# Patient Record
Sex: MALE | Race: WHITE | Employment: OTHER | URBAN - METROPOLITAN AREA
[De-identification: names, ages, dates, MRNs, and addresses within clinical notes are randomized per-mention and may not be internally consistent; named-entity substitution may affect disease eponyms.]

---

## 2013-09-13 LAB — COLONOSCOPY, EXTERNAL: NORMAL

## 2018-06-08 ENCOUNTER — OFFICE VISIT (OUTPATIENT)
Dept: FAMILY MEDICINE CLINIC | Age: 68
End: 2018-06-08

## 2018-06-08 ENCOUNTER — PATIENT MESSAGE (OUTPATIENT)
Dept: FAMILY MEDICINE CLINIC | Age: 68
End: 2018-06-08

## 2018-06-08 VITALS
HEIGHT: 73 IN | TEMPERATURE: 98.2 F | WEIGHT: 174 LBS | BODY MASS INDEX: 23.06 KG/M2 | RESPIRATION RATE: 18 BRPM | DIASTOLIC BLOOD PRESSURE: 76 MMHG | SYSTOLIC BLOOD PRESSURE: 138 MMHG | OXYGEN SATURATION: 97 % | HEART RATE: 64 BPM

## 2018-06-08 DIAGNOSIS — Z12.11 SCREENING FOR COLON CANCER: ICD-10-CM

## 2018-06-08 DIAGNOSIS — M85.80 OSTEOPENIA, UNSPECIFIED LOCATION: ICD-10-CM

## 2018-06-08 DIAGNOSIS — Z00.00 ENCOUNTER FOR MEDICARE ANNUAL WELLNESS EXAM: Primary | ICD-10-CM

## 2018-06-08 DIAGNOSIS — E78.5 HYPERLIPIDEMIA, UNSPECIFIED HYPERLIPIDEMIA TYPE: ICD-10-CM

## 2018-06-08 DIAGNOSIS — Z11.59 NEED FOR HEPATITIS C SCREENING TEST: ICD-10-CM

## 2018-06-08 DIAGNOSIS — M79.2 NEUROPATHIC PAIN, LEG, RIGHT: ICD-10-CM

## 2018-06-08 RX ORDER — ATORVASTATIN CALCIUM 10 MG/1
10 TABLET, FILM COATED ORAL DAILY
COMMUNITY
End: 2018-06-12 | Stop reason: SDUPTHER

## 2018-06-08 RX ORDER — GABAPENTIN 300 MG/1
300 CAPSULE ORAL
COMMUNITY
End: 2018-06-12 | Stop reason: SDUPTHER

## 2018-06-08 RX ORDER — GUAIFENESIN 100 MG/5ML
81 LIQUID (ML) ORAL DAILY
COMMUNITY
End: 2018-07-18

## 2018-06-08 RX ORDER — NABUMETONE 750 MG/1
750 TABLET, FILM COATED ORAL AS NEEDED
COMMUNITY
End: 2018-07-18

## 2018-06-08 RX ORDER — ALENDRONATE SODIUM 70 MG/1
70 TABLET ORAL
COMMUNITY
End: 2018-06-12 | Stop reason: SDUPTHER

## 2018-06-08 NOTE — MR AVS SNAPSHOT
[facility] 
 
 
 [address] 
[phone] Patient: Sanchez Arroyo MRN: [id number] POW:9[birth year]34 Visit Information Date & Time Provider Department Dept. Phone Encounter #  
 6/8/2018  8:30 AM More Michel  [facility] [phone] [id number] Follow-up Instructions Return in about 3 months (around 9/8/2018) for HTN follow up, Routine follow up (30 min). Upcoming Health Maintenance Date Due Hepatitis C Screening 1950 DTaP/Tdap/Td series (1 - Tdap) 7/19/1972 FOBT Q 1 YEAR AGE 50-75 7/19/2001 ZOSTER VACCINE AGE 60> 5/19/2011 GLAUCOMA SCREENING Q2Y 7/19/2016 Pneumococcal 65+ Low/Medium Risk (1 of 2 - PCV13) 7/19/2016 Influenza Age 5 to Adult 8/1/2018 Allergies as of 6/8/2018  Review Complete On: 6/8/2018 By: Ed Bartlett No Known Allergies Current Immunizations  Never Reviewed Name Date Hep B Vaccine 8/6/2014, 3/5/2014, 2/6/2014 Not reviewed this visit You Were Diagnosed With   
  
 Codes Comments Hyperlipidemia, unspecified hyperlipidemia type    -  Primary ICD-10-CM: E78.5 ICD-9-CM: 272.4 Osteopenia, unspecified location     ICD-10-CM: M85.80 ICD-9-CM: 733.90 Need for hepatitis C screening test     ICD-10-CM: Z11.59 
ICD-9-CM: V73.89 Screening for colon cancer     ICD-10-CM: Z12.11 ICD-9-CM: V76.51 Vitals BP Pulse Temp Resp Height(growth percentile) Weight(growth percentile) 160/78 (BP 1 Location: Right arm, BP Patient Position: Sitting) 64 98.2 °F (36.8 °C) (Oral) 18 6' 1\" (1.854 m) 174 lb (78.9 kg) SpO2 BMI Smoking Status 97% 22.96 kg/m2 Former Smoker BMI and BSA Data Body Mass Index Body Surface Area  
 22.96 kg/m 2 2.02 m 2 Preferred Pharmacy Pharmacy Name Phone Barbara Urban [address] [facility], [address] [facility] [phone] Your Updated Medication List  
  
   
This list is accurate as of 6/8/18  9:17 AM.  Always use your most recent med list.  
  
  
  
  
 alendronate 70 mg tablet Commonly known as:  FOSAMAX Take 70 mg by mouth every seven (7) days. aspirin 81 mg chewable tablet Take 81 mg by mouth daily. atorvastatin 10 mg tablet Commonly known as:  LIPITOR Take 10 mg by mouth daily. gabapentin 300 mg capsule Commonly known as:  NEURONTIN Take 300 mg by mouth ACB/HS. nabumetone 750 mg tablet Commonly known as:  RELAFEN Take 750 mg by mouth as needed for Pain. We Performed the Following HCV AB W/RFLX TO TONY [72370 CPT(R)] LIPID PANEL [12549 CPT(R)] METABOLIC PANEL, COMPREHENSIVE [44892 CPT(R)] REFERRAL TO GASTROENTEROLOGY [DXK95 Custom] Comments:  
 Patient will schedule referral himself/herself VITAMIN D, 25 HYDROXY [id number] CPT(R)] Follow-up Instructions Return in about 3 months (around 9/8/2018) for HTN follow up, Routine follow up (30 min). Referral Information Referral ID Referred By Referred To  
  
 [id number] [facility] [address], [address] Phone: [phone] Fax: [phone] Visits Status Start Date End Date 1 New Request 6/8/18 6/8/19 If your referral has a status of pending review or denied, additional information will be sent to support the outcome of this decision. Patient Instructions Back Pain: Care Instructions Your Care Instructions Back pain has many possible causes. It is often related to problems with muscles and ligaments of the back. It may also be related to problems with the nerves, discs, or bones of the back. Moving, lifting, standing, sitting, or sleeping in an awkward way can strain the back.  Sometimes you don't notice the injury until later. Arthritis is another common cause of back pain. Although it may hurt a lot, back pain usually improves on its own within several weeks. Most people recover in 12 weeks or less. Using good home treatment and being careful not to stress your back can help you feel better sooner. Follow-up care is a key part of your treatment and safety. Be sure to make and go to all appointments, and call your doctor if you are having problems. It's also a good idea to know your test results and keep a list of the medicines you take. How can you care for yourself at home? · Sit or lie in positions that are most comfortable and reduce your pain. Try one of these positions when you lie down: ¨ Lie on your back with your knees bent and supported by large pillows. ¨ Lie on the floor with your legs on the seat of a sofa or chair. Jeffory Amass on your side with your knees and hips bent and a pillow between your legs. ¨ Lie on your stomach if it does not make pain worse. · Do not sit up in bed, and avoid soft couches and twisted positions. Bed rest can help relieve pain at first, but it delays healing. Avoid bed rest after the first day of back pain. · Change positions every 30 minutes. If you must sit for long periods of time, take breaks from sitting. Get up and walk around, or lie in a comfortable position. · Try using a heating pad on a low or medium setting for 15 to 20 minutes every 2 or 3 hours. Try a warm shower in place of one session with the heating pad. · You can also try an ice pack for 10 to 15 minutes every 2 to 3 hours. Put a thin cloth between the ice pack and your skin. · Take pain medicines exactly as directed. ¨ If the doctor gave you a prescription medicine for pain, take it as prescribed. ¨ If you are not taking a prescription pain medicine, ask your doctor if you can take an over-the-counter medicine. · Take short walks several times a day.  You can start with 5 to 10 minutes, 3 or 4 times a day, and work up to longer walks. Walk on level surfaces and avoid hills and stairs until your back is better. · Return to work and other activities as soon as you can. Continued rest without activity is usually not good for your back. · To prevent future back pain, do exercises to stretch and strengthen your back and stomach. Learn how to use good posture, safe lifting techniques, and proper body mechanics. When should you call for help? Call your doctor now or seek immediate medical care if: 
? · You have new or worsening numbness in your legs. ? · You have new or worsening weakness in your legs. (This could make it hard to stand up.) ? · You lose control of your bladder or bowels. ? Watch closely for changes in your health, and be sure to contact your doctor if: 
? · Your pain gets worse. ? · You are not getting better after 2 weeks. Where can you learn more? Go to http://sarah-urszula.info/. Enter M208 in the search box to learn more about \"Back Pain: Care Instructions. \" Current as of: March 21, 2017 Content Version: 11.4 © 2378-9742 [employer]. Care instructions adapted under license by [employer] (which disclaims liability or warranty for this information). If you have questions about a medical condition or this instruction, always ask your healthcare professional. [address] any warranty or liability for your use of this information. Introducing [facility]! Chelsea Grullon introduces [employer] patient portal. Now you can access parts of your medical record, email your doctor's office, and request medication refills online. 1. In your internet browser, go to [email or web address] 2. Click on the First Time User? Click Here link in the Sign In box. You will see the New Member Sign Up page. 3. Enter your [employer] Access Code exactly as it appears below.  You will not need to use this code after youve completed the sign-up process. If you do not sign up before the expiration date, you must request a new code. · [employer] Access Code: [email or web address] Expires: 9/6/2018  8:24 AM 
 
4. Enter the last four digits of your Social Security Number (xxxx) and Date of Birth (mm/dd/yyyy) as indicated and click Submit. You will be taken to the next sign-up page. 5. Create a [employer] ID. This will be your [employer] login ID and cannot be changed, so think of one that is secure and easy to remember. 6. Create a [employer] password. You can change your password at any time. 7. Enter your Password Reset Question and Answer. This can be used at a later time if you forget your password. 8. Enter your e-mail address. You will receive e-mail notification when new information is available in [address] 9. Click Sign Up. You can now view and download portions of your medical record. 10. Click the Download Summary menu link to download a portable copy of your medical information. If you have questions, please visit the Frequently Asked Questions section of the [employer] website. Remember, [employer] is NOT to be used for urgent needs. For medical emergencies, dial 911. Now available from your iPhone and Android! Please provide this summary of care documentation to your next provider. Your primary care clinician is listed as Ke Douglas. If you have any questions after today's visit, please call [phone].

## 2018-06-08 NOTE — PROGRESS NOTES
Chief Complaint   Patient presents with    New Patient     1. Have you been to the ER, urgent care clinic since your last visit? Hospitalized since your last visit? No    2. Have you seen or consulted any other health care providers outside of the [address] [place] [address] since your last visit? Include any pap smears or colon screening.  No

## 2018-06-08 NOTE — PROGRESS NOTES
Nelson Romo is a 78 y.o. male and presents for annual Medicare Wellness Visit. Problem List: Reviewed with patient and discussed risk factors. Patient Active Problem List   Diagnosis Code    HLD (hyperlipidemia) E78.5    Osteopenia M85.80    Spinal stenosis M48.00    Hiatal hernia K44.9    Schatzki's ring K22.2    Neuropathic pain, leg, right G57.91       Current medical providers:  Patient Care Team:  Francy Hall MD as PCP - General (Family Practice)    PSH: Reviewed with patient  Past Surgical History:   Procedure Laterality Date    HX COLONOSCOPY  09/13/2013    polyps    HX ENDOSCOPY  08/2017    HX HEENT Left 2016    cataract        SH: Reviewed with patient  Social History   Substance Use Topics    Smoking status: Former Smoker    Smokeless tobacco: Never Used    Alcohol use 0.6 oz/week     1 Shots of liquor per week       FH: Reviewed with patient  Family History   Problem Relation Age of Onset    COPD Father     Stomach Cancer Maternal Grandfather        Medications/Allergies: Reviewed with patient  No current outpatient prescriptions on file prior to visit. No current facility-administered medications on file prior to visit. No Known Allergies    Objective:  Visit Vitals    /78 (BP 1 Location: Right arm, BP Patient Position: Sitting)    Pulse 64    Temp 98.2 °F (36.8 °C) (Oral)    Resp 18    Ht 6' 1\" (1.854 m)    Wt 174 lb (78.9 kg)    SpO2 97%    BMI 22.96 kg/m2    Body mass index is 22.96 kg/(m^2). Assessment of cognitive impairment: Alert and oriented x 3    Depression Screen:   PHQ over the last two weeks 6/8/2018   Little interest or pleasure in doing things Not at all   Feeling down, depressed or hopeless Not at all   Total Score PHQ 2 0       Fall Risk Assessment:    Fall Risk Assessment, last 12 mths 6/8/2018   Able to walk? Yes   Fall in past 12 months? No       Functional Ability:   Does the patient exhibit a steady gait?   yes   How long did it take the patient to get up and walk from a sitting position? 3 secs   Is the patient self reliant?  (ie can do own laundry, meals, household chores)  yes     Does the patient handle his/her own medications? yes     Does the patient handle his/her own money? yes     Is the patients home safe (ie good lighting, handrails on stairs and bath, etc.)? yes     Did you notice or did patient express any hearing difficulties? no     Did you notice or did patient express any vision difficulties?   no     Were distance and reading eye charts used? no       Advance Care Planning:   Patient was offered the opportunity to discuss advance care planning:  yes     Does patient have an Advance Directive:  yes   If no, did you provide information on Caring Connections? Pt to bring by copy       Plan:      Orders Placed This Encounter    METABOLIC PANEL, COMPREHENSIVE    LIPID PANEL    VITAMIN D, 25 HYDROXY    HCV AB W/RFLX TO TONY    REFERRAL TO GASTROENTEROLOGY    gabapentin (NEURONTIN) 300 mg capsule    alendronate (FOSAMAX) 70 mg tablet    atorvastatin (LIPITOR) 10 mg tablet    aspirin 81 mg chewable tablet    nabumetone (RELAFEN) 750 mg tablet       Health Maintenance   Topic Date Due    Hepatitis C Screening  1950    COLONOSCOPY  07/19/1969    GLAUCOMA SCREENING Q2Y  07/19/2016    Influenza Age 9 to Adult  08/01/2018    MEDICARE YEARLY EXAM  06/09/2019    DTaP/Tdap/Td series (2 - Td) 02/27/2023    ZOSTER VACCINE AGE 60>  Completed    Pneumococcal 65+ Low/Medium Risk  Completed       *Patient verbalized understanding and agreement with the plan. A copy of the After Visit Summary with personalized health plan was given to the patient today.

## 2018-06-08 NOTE — ACP (ADVANCE CARE PLANNING)
Advance Care Planning:   Patient was offered the opportunity to discuss advance care planning:  yes   Does patient have an Advance Directive:  yes   If no, did you provide information on Caring Connections?   Pt to bring by copy

## 2018-06-08 NOTE — PROGRESS NOTES
Patient Name: Nelson Romo   MRN: <[id number]>    Ericka Ramirez is a 78 y.o. male who presents with the following: Here to establish care with new PCP. Colon Cancer Screening: not up to date - will refer to GI. Hep C: due   PSA: pt declines screening after reviewing risks/benefits  AAA screening: done in past; normal    CAD risk factors:  HTN: elevated today, no hx of HTN  Lipid: on statin  DM: negative. Has been on Fosamax for possibly 5 years due to osteopenia and history compression fracture. Last DEXA scan was January 2017. Tolerating medication well. Recently establish care with [facility] due to ongoing spinal stenosis and R leg neuropathy pain. Currently taking gabapentin 600 mg at nighttime; was started initially at 300 mg in the morning with 600 mg in the evening but found that the morning dose with too sedating. Has not tried lower doses in the past.  [facility]ill of gabapentin 300 mg at the 90 pills on 4/30/18. Review of Systems   Constitutional: Negative for chills, fever, malaise/fatigue and weight loss. HENT: Negative for hearing loss, nosebleeds and sore throat. Respiratory: Negative for cough, sputum production, shortness of breath and wheezing. Cardiovascular: Negative for chest pain, palpitations, leg swelling and PND. Gastrointestinal: Negative for abdominal pain, blood in stool, constipation, diarrhea, nausea and vomiting. Genitourinary: Negative for dysuria, frequency and urgency. Musculoskeletal: Positive for back pain. Negative for falls, joint pain, myalgias and neck pain. Skin: Negative for itching and rash. Neurological: Positive for tingling. Negative for dizziness, sensory change, focal weakness and loss of consciousness. Psychiatric/Behavioral: Negative for depression. The patient is not nervous/anxious. All other systems reviewed and are negative.       The patient's medications, allergies, past medical history, surgical history, family history and social history were reviewed and updated where appropriate. Prior to Admission medications    Medication Sig Start Date End Date Taking? Authorizing Provider   gabapentin (NEURONTIN) 300 mg capsule Take 300 mg by mouth ACB/HS. Yes Historical Provider   alendronate (FOSAMAX) 70 mg tablet Take 70 mg by mouth every seven (7) days. Yes Historical Provider   atorvastatin (LIPITOR) 10 mg tablet Take 10 mg by mouth daily. Yes Historical Provider   aspirin 81 mg chewable tablet Take 81 mg by mouth daily. Yes Historical Provider   nabumetone (RELAFEN) 750 mg tablet Take 750 mg by mouth as needed for Pain. Yes Historical Provider       No Known Allergies      Past Medical History:   Diagnosis Date    Hiatal hernia     HLD (hyperlipidemia)     Neuropathic pain, leg, right     Osteopenia     DEXA 1/25/17    Schatzki's ring     Spinal stenosis     DJD, stenosis, cyst along L5       Past Surgical History:   Procedure Laterality Date    HX COLONOSCOPY  09/13/2013    polyps    HX ENDOSCOPY  08/2017    HX HEENT Left 2016    cataract       Family History   Problem Relation Age of Onset    COPD Father     Stomach Cancer Maternal Grandfather        Social History     Social History    Marital status:      Spouse name: N/A    Number of children: N/A    Years of education: N/A     Occupational History    Not on file.      Social History Main Topics    Smoking status: Former Smoker    Smokeless tobacco: Never Used    Alcohol use 0.6 oz/week     1 Shots of liquor per week    Drug use: No    Sexual activity: Not on file     Other Topics Concern    Not on file     Social History Narrative    No narrative on file           OBJECTIVE    Visit Vitals    /78 (BP 1 Location: Right arm, BP Patient Position: Sitting)    Pulse 64    Temp 98.2 °F (36.8 °C) (Oral)    Resp 18    Ht 6' 1\" (1.854 m)    Wt 174 lb (78.9 kg)    SpO2 97%    BMI 22.96 kg/m2 Physical Exam   Constitutional: He is oriented to person, place, and time and well-developed, well-nourished, and in no distress. No distress. Eyes: Conjunctivae and EOM are normal. Pupils are equal, round, and reactive to light. Cardiovascular: Normal rate, regular rhythm and normal heart sounds. Exam reveals no gallop and no friction rub. No murmur heard. Pulmonary/Chest: Effort normal and breath sounds normal. No respiratory distress. He has no wheezes. Neurological: He is alert and oriented to person, place, and time. Skin: Skin is warm and dry. No rash noted. He is not diaphoretic. Psychiatric: Mood, memory, affect and judgment normal.   Nursing note and vitals reviewed. ASSESSMENT AND PLAN  Santosh Paz is a 78 y.o. male who presents today for:    1. Encounter for Medicare annual wellness exam  See other note. 2. Hyperlipidemia, unspecified hyperlipidemia type  Will calculate ASCVD risk score pending labs. - METABOLIC PANEL, COMPREHENSIVE  - LIPID PANEL    3. Osteopenia, unspecified location  Stable, continue current treatment pending review of labs. Will repeat DEXA in 1/2019; consider drug holiday of Fosamax at that time. - VITAMIN D, 25 HYDROXY    4. Need for hepatitis C screening test  - HCV AB W/RFLX TO TONY    5. Screening for colon cancer  - REFERRAL TO GASTROENTEROLOGY    6. Neuropathic pain, leg, right  Advised pt to decrease to 300 mg of gabapentin nightly to see if symptoms well controlled at lower doses. Discussed availability of 100 mg capsules for additional titrated prn. There are no discontinued medications. Follow-up Disposition:  Return in about 3 months (around 9/8/2018) for HTN follow up, Routine follow up (30 min). Medication risks/benefits/costs/interactions/alternatives discussed with patient.   Advised patient to call back or return to office if symptoms worsen/change/persist. If patient cannot reach us or should anything more severe/urgent arise he/she should proceed directly to the nearest emergency department. Discussed expected course/resolution/complications of diagnosis in detail with patient. Patient given a written after visit summary which includes his/her diagnoses, current medications and vitals. Patient expressed understanding with the diagnosis and plan.      Gayatri Turcios M.D.

## 2018-06-08 NOTE — PATIENT INSTRUCTIONS

## 2018-06-12 RX ORDER — ALENDRONATE SODIUM 70 MG/1
70 TABLET ORAL
Qty: 12 TAB | Refills: 1 | Status: SHIPPED | OUTPATIENT
Start: 2018-06-12 | End: 2018-11-14 | Stop reason: SDUPTHER

## 2018-06-12 RX ORDER — ATORVASTATIN CALCIUM 10 MG/1
10 TABLET, FILM COATED ORAL DAILY
Qty: 90 TAB | Refills: 1 | Status: SHIPPED | OUTPATIENT
Start: 2018-06-12 | End: 2018-11-14 | Stop reason: SDUPTHER

## 2018-06-12 RX ORDER — GABAPENTIN 300 MG/1
300 CAPSULE ORAL
Qty: 90 CAP | Refills: 0 | Status: SHIPPED | OUTPATIENT
Start: 2018-06-12 | End: 2019-02-15

## 2018-06-15 LAB
25(OH)D3+25(OH)D2 SERPL-MCNC: 58.7 NG/ML (ref 30–100)
ALBUMIN SERPL-MCNC: 4.2 G/DL (ref 3.6–4.8)
ALBUMIN/GLOB SERPL: 2.1 {RATIO} (ref 1.2–2.2)
ALP SERPL-CCNC: 49 IU/L (ref 39–117)
ALT SERPL-CCNC: 21 IU/L (ref 0–44)
AST SERPL-CCNC: 27 IU/L (ref 0–40)
BILIRUB SERPL-MCNC: 0.6 MG/DL (ref 0–1.2)
BUN SERPL-MCNC: 12 MG/DL (ref 8–27)
BUN/CREAT SERPL: 10 (ref 10–24)
CALCIUM SERPL-MCNC: 9.2 MG/DL (ref 8.6–10.2)
CHLORIDE SERPL-SCNC: 106 MMOL/L (ref 96–106)
CHOLEST SERPL-MCNC: 128 MG/DL (ref 100–199)
CO2 SERPL-SCNC: 23 MMOL/L (ref 20–29)
CREAT SERPL-MCNC: 1.22 MG/DL (ref 0.76–1.27)
GFR SERPLBLD CREATININE-BSD FMLA CKD-EPI: 61 ML/MIN/1.73
GFR SERPLBLD CREATININE-BSD FMLA CKD-EPI: 71 ML/MIN/1.73
GLOBULIN SER CALC-MCNC: 2 G/DL (ref 1.5–4.5)
GLUCOSE SERPL-MCNC: 86 MG/DL (ref 65–99)
HCV AB S/CO SERPL IA: <0.1 S/CO RATIO (ref 0–0.9)
HCV AB SERPL QL IA: NORMAL
HDLC SERPL-MCNC: 65 MG/DL
INTERPRETATION, 910389: NORMAL
LDLC SERPL CALC-MCNC: 54 MG/DL (ref 0–99)
POTASSIUM SERPL-SCNC: 4.6 MMOL/L (ref 3.5–5.2)
PROT SERPL-MCNC: 6.2 G/DL (ref 6–8.5)
SODIUM SERPL-SCNC: 142 MMOL/L (ref 134–144)
TRIGL SERPL-MCNC: 44 MG/DL (ref 0–149)
VLDLC SERPL CALC-MCNC: 9 MG/DL (ref 5–40)

## 2018-07-18 RX ORDER — ASCORBIC ACID 500 MG
500 TABLET ORAL DAILY
COMMUNITY
End: 2021-11-04

## 2018-07-19 ENCOUNTER — ANESTHESIA EVENT (OUTPATIENT)
Dept: ENDOSCOPY | Age: 68
End: 2018-07-19
Payer: MEDICARE

## 2018-07-19 ENCOUNTER — HOSPITAL ENCOUNTER (OUTPATIENT)
Age: 68
Setting detail: OUTPATIENT SURGERY
Discharge: HOME | End: 2018-07-19
Attending: INTERNAL MEDICINE | Admitting: INTERNAL MEDICINE
Payer: MEDICARE

## 2018-07-19 ENCOUNTER — ANESTHESIA (OUTPATIENT)
Dept: ENDOSCOPY | Age: 68
End: 2018-07-19
Payer: MEDICARE

## 2018-07-19 VITALS
DIASTOLIC BLOOD PRESSURE: 66 MMHG | WEIGHT: 174 LBS | SYSTOLIC BLOOD PRESSURE: 122 MMHG | TEMPERATURE: 97.4 F | RESPIRATION RATE: 17 BRPM | HEIGHT: 73 IN | BODY MASS INDEX: 23.06 KG/M2 | OXYGEN SATURATION: 93 %

## 2018-07-19 PROCEDURE — 74011000250 HC RX REV CODE- 250

## 2018-07-19 PROCEDURE — 74011250637 HC RX REV CODE- 250/637: Performed by: INTERNAL MEDICINE

## 2018-07-19 PROCEDURE — 74011250636 HC RX REV CODE- 250/636

## 2018-07-19 PROCEDURE — 77030027957 HC TBNG IRR ENDOGTR BUSS -B: Performed by: INTERNAL MEDICINE

## 2018-07-19 PROCEDURE — 76040000019: Performed by: INTERNAL MEDICINE

## 2018-07-19 PROCEDURE — 88305 TISSUE EXAM BY PATHOLOGIST: CPT | Performed by: INTERNAL MEDICINE

## 2018-07-19 PROCEDURE — 76060000031 HC ANESTHESIA FIRST 0.5 HR: Performed by: INTERNAL MEDICINE

## 2018-07-19 PROCEDURE — 77030009426 HC FCPS BIOP ENDOSC BSC -B: Performed by: INTERNAL MEDICINE

## 2018-07-19 RX ORDER — SODIUM CHLORIDE 9 MG/ML
100 INJECTION, SOLUTION INTRAVENOUS CONTINUOUS
Status: DISCONTINUED | OUTPATIENT
Start: 2018-07-19 | End: 2018-07-19 | Stop reason: HOSPADM

## 2018-07-19 RX ORDER — SODIUM CHLORIDE 0.9 % (FLUSH) 0.9 %
5-10 SYRINGE (ML) INJECTION AS NEEDED
Status: DISCONTINUED | OUTPATIENT
Start: 2018-07-19 | End: 2018-07-19 | Stop reason: HOSPADM

## 2018-07-19 RX ORDER — ATROPINE SULFATE 0.1 MG/ML
0.5 INJECTION INTRAVENOUS
Status: DISCONTINUED | OUTPATIENT
Start: 2018-07-19 | End: 2018-07-19 | Stop reason: HOSPADM

## 2018-07-19 RX ORDER — PROPOFOL 10 MG/ML
INJECTION, EMULSION INTRAVENOUS AS NEEDED
Status: DISCONTINUED | OUTPATIENT
Start: 2018-07-19 | End: 2018-07-19 | Stop reason: HOSPADM

## 2018-07-19 RX ORDER — SODIUM CHLORIDE 9 MG/ML
INJECTION, SOLUTION INTRAVENOUS
Status: DISCONTINUED | OUTPATIENT
Start: 2018-07-19 | End: 2018-07-19 | Stop reason: HOSPADM

## 2018-07-19 RX ORDER — FENTANYL CITRATE 50 UG/ML
100 INJECTION, SOLUTION INTRAMUSCULAR; INTRAVENOUS
Status: DISCONTINUED | OUTPATIENT
Start: 2018-07-19 | End: 2018-07-19 | Stop reason: HOSPADM

## 2018-07-19 RX ORDER — FLUMAZENIL 0.1 MG/ML
0.2 INJECTION INTRAVENOUS
Status: DISCONTINUED | OUTPATIENT
Start: 2018-07-19 | End: 2018-07-19 | Stop reason: HOSPADM

## 2018-07-19 RX ORDER — LIDOCAINE HYDROCHLORIDE 20 MG/ML
INJECTION, SOLUTION EPIDURAL; INFILTRATION; INTRACAUDAL; PERINEURAL AS NEEDED
Status: DISCONTINUED | OUTPATIENT
Start: 2018-07-19 | End: 2018-07-19 | Stop reason: HOSPADM

## 2018-07-19 RX ORDER — DEXTROMETHORPHAN/PSEUDOEPHED 2.5-7.5/.8
1.2 DROPS ORAL
Status: DISCONTINUED | OUTPATIENT
Start: 2018-07-19 | End: 2018-07-19 | Stop reason: HOSPADM

## 2018-07-19 RX ORDER — NALOXONE HYDROCHLORIDE 0.4 MG/ML
0.4 INJECTION, SOLUTION INTRAMUSCULAR; INTRAVENOUS; SUBCUTANEOUS
Status: DISCONTINUED | OUTPATIENT
Start: 2018-07-19 | End: 2018-07-19 | Stop reason: HOSPADM

## 2018-07-19 RX ORDER — SODIUM CHLORIDE 0.9 % (FLUSH) 0.9 %
5-10 SYRINGE (ML) INJECTION EVERY 8 HOURS
Status: DISCONTINUED | OUTPATIENT
Start: 2018-07-19 | End: 2018-07-19 | Stop reason: HOSPADM

## 2018-07-19 RX ORDER — DIPHENHYDRAMINE HYDROCHLORIDE 50 MG/ML
50 INJECTION, SOLUTION INTRAMUSCULAR; INTRAVENOUS ONCE
Status: DISCONTINUED | OUTPATIENT
Start: 2018-07-19 | End: 2018-07-19 | Stop reason: HOSPADM

## 2018-07-19 RX ORDER — MIDAZOLAM HYDROCHLORIDE 1 MG/ML
.25-1 INJECTION, SOLUTION INTRAMUSCULAR; INTRAVENOUS
Status: DISCONTINUED | OUTPATIENT
Start: 2018-07-19 | End: 2018-07-19 | Stop reason: HOSPADM

## 2018-07-19 RX ORDER — EPINEPHRINE 0.1 MG/ML
1 INJECTION INTRACARDIAC; INTRAVENOUS
Status: DISCONTINUED | OUTPATIENT
Start: 2018-07-19 | End: 2018-07-19 | Stop reason: HOSPADM

## 2018-07-19 RX ADMIN — PROPOFOL 30 MG: 10 INJECTION, EMULSION INTRAVENOUS at 09:54

## 2018-07-19 RX ADMIN — PROPOFOL 50 MG: 10 INJECTION, EMULSION INTRAVENOUS at 09:51

## 2018-07-19 RX ADMIN — PROPOFOL 50 MG: 10 INJECTION, EMULSION INTRAVENOUS at 10:00

## 2018-07-19 RX ADMIN — PROPOFOL 40 MG: 10 INJECTION, EMULSION INTRAVENOUS at 09:49

## 2018-07-19 RX ADMIN — LIDOCAINE HYDROCHLORIDE 60 MG: 20 INJECTION, SOLUTION EPIDURAL; INFILTRATION; INTRACAUDAL; PERINEURAL at 09:48

## 2018-07-19 RX ADMIN — PROPOFOL 30 MG: 10 INJECTION, EMULSION INTRAVENOUS at 09:52

## 2018-07-19 RX ADMIN — SODIUM CHLORIDE: 9 INJECTION, SOLUTION INTRAVENOUS at 09:30

## 2018-07-19 RX ADMIN — PROPOFOL 60 MG: 10 INJECTION, EMULSION INTRAVENOUS at 09:48

## 2018-07-19 NOTE — PROCEDURES
[address]  [address] [facility], [address] [place] [address]        Colonoscopy Operative Report    Jean Paul Roa  [id number]  1950      Procedure Type:   Colonoscopy --screening     Indications:    Personal history of colon polyps (screening only)         Pre-operative Diagnosis: see indication above    Post-operative Diagnosis:  See findings below    :  Eugenia NeriAntwan Arredondo MD      Referring Provider: Lisa Veras MD      Sedation:  MAC anesthesia Propofol      Procedure Details:  After informed consent was obtained with all risks and benefits of procedure explained and preoperative exam completed, the patient was taken to the endoscopy suite and placed in the left lateral decubitus position. Upon sequential sedation as per above, a digital rectal exam was performed demonstrating internal hemorrhoids. The Olympus pediatric videocolonoscope  was inserted in the rectum and carefully advanced to the cecum, which was identified by the ileocecal valve and appendiceal orifice. The cecum was identified by the ileocecal valve and appendiceal orifice. The quality of preparation was good. The colonoscope was slowly withdrawn with careful evaluation between folds. Retroflexion in the rectum was completed . Findings:   Rectum: small internal hemorrhoids  Sigmoid: normal  Descending Colon: normal  Transverse Colon: normal  Ascending Colon: in the proximal ascending colon (approximately two folds distal to the IC valve), there was a focal region of what appeared to be redundant folds with possibly a central umbilication vs diverticulum. Under NBI this did not have an abnormal vascular pattern or a distinct margin. This was cold biopsied. Cecum: normal  Terminal Ileum: not intubated      Specimen Removed: 1. Ascending colon biopsy    Complications: None. EBL:  None. Impression:     1. Ascending colon thickened fold - biopsied  2.  Small internal hemorrhoids    Recommendations:  1. Follow up surgical pathology  2. Next step and timing of repeat colonoscopy to be determined based on pathology results  3. High fiber diet education    Signed By: Juanita Diaz.  Bhumi Camargo MD     7/19/2018  10:17 AM

## 2018-07-19 NOTE — IP AVS SNAPSHOT
2700 [facility] [address] [place] [address] 
[phone] Patient: Silvia Coyle MRN: [id number] NCR:[id number] About your hospitalization You were admitted on:  July 19, 2018 You last received care in the:  [facility] ENDOSCOPY You were discharged on:  July 19, 2018 Why you were hospitalized Your primary diagnosis was:  Not on File Follow-up Information None Your Scheduled Appointments Thursday September 13, 2018  8:15 AM EDT ROUTINE CARE with Jose M Serrano MD  
[facility]) 222 [facility] [address] [place] [address]  
[phone] Discharge Orders None A check shante indicates which time of day the medication should be taken. My Medications CONTINUE taking these medications Instructions Each Dose to Equal  
 Morning Noon Evening Bedtime  
 alendronate 70 mg tablet Commonly known as:  FOSAMAX Your last dose was: Your next dose is: Take 1 Tab by mouth every seven (7) days. Indications: OSTEOPOROSIS IN MALE PATIENT 70 mg  
    
   
   
   
  
 ASPIRIN PO Your last dose was: Your next dose is: Take 81 mg by mouth daily. 81 mg  
    
   
   
   
  
 atorvastatin 10 mg tablet Commonly known as:  LIPITOR Your last dose was: Your next dose is: Take 1 Tab by mouth daily. 10 mg CALCIUM + D PO Your last dose was: Your next dose is: Take 600 mg by mouth two (2) times a day. 600 mg  
    
   
   
   
  
 gabapentin 300 mg capsule Commonly known as:  NEURONTIN Your last dose was: Your next dose is: Take 1 Cap by mouth nightly. 300 mg PROBIOTIC PO Your last dose was: Your next dose is: Take  by mouth. Takes one po once daily. VITAMIN B COMPLEX PO Your last dose was: Your next dose is: Take  by mouth. Takes one po once daily. VITAMIN C 500 mg tablet Generic drug:  ascorbic acid (vitamin C) Your last dose was: Your next dose is: Take 500 mg by mouth daily. 500 mg Discharge Instructions [address] 
611 [facility], 869 [facility] COLON DISCHARGE INSTRUCTIONS Elvira Villalobos [id number] 1950 Discomfort: 
Redness at IV site- apply warm compress to area; if redness or soreness persist- contact your physician There may be a slight amount of blood passed from the rectum Gaseous discomfort- walking, belching will help relieve any discomfort You may not operate a vehicle for 12 hours You may not engage in an occupation involving machinery or appliances for rest of today You may not drink alcoholic beverages for at least 12 hours Avoid making any critical decisions for at least 24 hour DIET: 
You may resume your regular diet  however -  remember your colon is empty and a heavy meal will produce gas. Avoid these foods:  vegetables, fried / greasy foods, carbonated drinks ACTIVITY: 
You may  resume your normal daily activities it is recommended that you spend the remainder of the day resting -  avoid any strenuous activity. CALL M.D. ANY SIGN OF: Increasing pain, nausea, vomiting Abdominal distension (swelling) New increased bleeding (oral or rectal) Fever (chills) Pain in chest area Bloody discharge from nose or mouth Shortness of breath Follow-up Instructions: 
 Call Dr. Chuy Melendrez for any questions or problems at [phone] ENDOSCOPY FINDINGS: 
 Your colonoscopy showed an area of colon thickening, which was not distinctly a polyp. This was biopsied.  We will contact you about the results and when your next colonoscopy will be due. Please maintain a high fiber diet. Telephone # [phone] Signed By: Marisol Rose. Evens Lugo MD   
 7/19/2018  10:16 AM 
  
 
DISCHARGE SUMMARY from Nurse The following personal items collected during your admission are returned to you:  
Dental Appliance: Dental Appliances: None Vision: Visual Aid: None Hearing Aid:   
Jewelry:   
Clothing:   
Other Valuables:   
Valuables sent to safe:   
 
 
 
  
  
  
Introducing [facility]! Dear Louisa Malik: Thank you for requesting a [employer] account. Our records indicate that you already have an active [employer] account. You can access your account anytime at [email or web address] Did you know that you can access your hospital and ER discharge instructions at any time in [employer]? You can also review all of your test results from your hospital stay or ER visit. Additional Information If you have questions, please visit the Frequently Asked Questions section of the [employer] website at [email or web address] Remember, [employer] is NOT to be used for urgent needs. For medical emergencies, dial 911. Now available from your iPhone and Android! Introducing [employer] As a [facility] patient, I wanted to make you aware of our electronic visit tool called [employer]. [facility] 24/7 allows you to connect within minutes with a medical provider 24 hours a day, seven days a week via a mobile device or tablet or logging into a secure website from your computer. You can access [employer]her [employer]hton from anywhere in the [place].  
 
A virtual visit might be right for you when you have a simple condition and feel like you just dont want to get out of bed, or cant get away from work for an appointment, when your regular [facility] provider is not available (evenings, weekends or holidays), or when youre out of town and need minor care. Electronic visits cost only $49 and if the [employer] 24/7 provider determines a prescription is needed to treat your condition, one can be electronically transmitted to a nearby pharmacy*. Please take a moment to enroll today if you have not already done so. The enrollment process is free and takes just a few minutes. To enroll, please download the [employer]Connot[employer] raven to your tablet or phone, or visit [email or web address] to enroll on your computer. And, as an [address] [place] [address] patient with a [employer] account, the results of your visits will be scanned into your electronic medical record and your primary care provider will be able to view the scanned results. We urge you to continue to see your regular [employer] provider for your ongoing medical care. And while your primary care provider may not be the one available when you seek a [employer] virtual visit, the peace of mind you get from getting a real diagnosis real time can be priceless. For more information on [employer], view our Frequently Asked Questions (FAQs) at [email or web address]. Sincerely, 
 
Tim Mcgee MD 
Chief Medical Officer [address] *:  certain medications cannot be prescribed via [employer] Providers Seen During Your Hospitalization Provider Specialty Primary office phone Dustin Carney MD Gastroenterology [phone] Your Primary Care Physician (PCP) Primary Care Physician Office Phone Office Fax Alexander Jones [phone] You are allergic to the following No active allergies Recent Documentation Height Weight BMI Smoking Status 1.854 m 78.9 kg 22.96 kg/m2 Former Smoker Emergency Contacts Name Discharge Info Relation Home Work [facility] DISCHARGE CAREGIVER [3] Spouse [3] [phone] LuisManisha  Spouse [3] [phone] Patient Belongings The following personal items are in your possession at time of discharge: 
  Dental Appliances: None  Visual Aid: None Please provide this summary of care documentation to your next provider. Signatures-by signing, you are acknowledging that this After Visit Summary has been reviewed with you and you have received a copy. Patient Signature:  ____________________________________________________________ Date:  ____________________________________________________________  
  
[facility] Signature:  ____________________________________________________________ Date:  ____________________________________________________________

## 2018-07-19 NOTE — PERIOP NOTES

## 2018-07-19 NOTE — ANESTHESIA POSTPROCEDURE EVALUATION
Post-Anesthesia Evaluation and Assessment    Patient: Winnie Roman MRN: [id number]  SSN: [id number]    YOB: 1950  Age: 80 y.o. Sex: male       Cardiovascular Function/Vital Signs  Visit Vitals    /76    Pulse 69    Temp 36.4 °C (97.6 °F)    Resp (!) 7    Ht 6' 1\" (1.854 m)    Wt 78.9 kg (174 lb)    SpO2 98%    BMI 22.96 kg/m2       Patient is status post MAC anesthesia for Procedure(s):  COLONOSCOPY  COLON BIOPSY. Nausea/Vomiting: None    Postoperative hydration reviewed and adequate. Pain:  Pain Scale 1: Numeric (0 - 10) (07/19/18 0933)  Pain Intensity 1: 0 (07/19/18 0933)   Managed    Neurological Status: At baseline    Mental Status and Level of Consciousness: Arousable    Pulmonary Status:   O2 Device: Room air (07/19/18 0933)   Adequate oxygenation and airway patent    Complications related to anesthesia: None    Post-anesthesia assessment completed.  No concerns    Signed By: Seferino Mendoza MD     July 19, 2018

## 2018-07-19 NOTE — DISCHARGE INSTRUCTIONS
[address], [address] [place] [address]    COLON DISCHARGE INSTRUCTIONS    Sally Mattson  [id number]  1950    Discomfort:  Redness at IV site- apply warm compress to area; if redness or soreness persist- contact your physician  There may be a slight amount of blood passed from the rectum  Gaseous discomfort- walking, belching will help relieve any discomfort  You may not operate a vehicle for 12 hours  You may not engage in an occupation involving machinery or appliances for rest of today  You may not drink alcoholic beverages for at least 12 hours  Avoid making any critical decisions for at least 24 hour  DIET:  You may resume your regular diet - however -  remember your colon is empty and a heavy meal will produce gas. Avoid these foods:  vegetables, fried / greasy foods, carbonated drinks     ACTIVITY:  You may  resume your normal daily activities it is recommended that you spend the remainder of the day resting -  avoid any strenuous activity. CALL M.D. ANY SIGN OF:   Increasing pain, nausea, vomiting  Abdominal distension (swelling)  New increased bleeding (oral or rectal)  Fever (chills)  Pain in chest area  Bloody discharge from nose or mouth  Shortness of breath      Follow-up Instructions:   Call Dr. Sedalia Barthel for any questions or problems at [address]:   Your colonoscopy showed an area of colon thickening, which was not distinctly a polyp. This was biopsied. We will contact you about the results and when your next colonoscopy will be due. Please maintain a high fiber diet. Telephone # [phone]      Signed By: Jyothi Mix.  Omayra Olguin MD     7/19/2018  10:16 AM       DISCHARGE SUMMARY from Nurse    The following personal items collected during your admission are returned to you:   Dental Appliance: Dental Appliances: None  Vision: Visual Aid: None  Hearing Aid:    Jewelry:    Clothing:    Other Valuables:    Valuables sent to safe:

## 2018-07-19 NOTE — H&P
[address]  174 [facility], [address]      History and Physical       NAME:  Pedro Guerrero   :   1950   MRN:   [id number]             History of Present Illness:  Patient is a 80 y.o. who is seen for a personal history of colon polyps. Last colonoscopy was in  and polyps were removed. PMH:  Past Medical History:   Diagnosis Date    Encounter for screening colonoscopy 2013    Dr Marjorie Herring - biopsies performed and determined to be normal - repeat in 5 years    Hiatal hernia     HLD (hyperlipidemia)     pt states he was placed on lipitor d/t plaque on abdominal aorta not elevated cholesterol    Ill-defined condition     plaque was found in abdominal aorta on US    Ill-defined condition     hx malaria in 1970s    Neuropathic pain, leg, right     Osteopenia     DEXA 17    Schatzki's ring     Spinal stenosis     DJD, stenosis, cyst along L5       PSH:  Past Surgical History:   Procedure Laterality Date    HX COLONOSCOPY  2013    polyps    HX ENDOSCOPY  2017    HX HEENT Left     cataract       Allergies:  No Known Allergies    Home Medications:  Prior to Admission Medications   Prescriptions Last Dose Informant Patient Reported? Taking? ASPIRIN PO 7/15/2018  Yes Yes   Sig: Take 81 mg by mouth daily. Lactobacillus acidophilus (PROBIOTIC PO)   Yes Yes   Sig: Take  by mouth. Takes one po once daily. VITAMIN B COMPLEX PO   Yes Yes   Sig: Take  by mouth. Takes one po once daily. alendronate (FOSAMAX) 70 mg tablet   No Yes   Sig: Take 1 Tab by mouth every seven (7) days. Indications: OSTEOPOROSIS IN MALE PATIENT   ascorbic acid, vitamin C, (VITAMIN C) 500 mg tablet   Yes Yes   Sig: Take 500 mg by mouth daily. atorvastatin (LIPITOR) 10 mg tablet 2018  No Yes   Sig: Take 1 Tab by mouth daily.    calcium carbonate/vitamin D3 (CALCIUM + D PO)   Yes Yes   Sig: Take 600 mg by mouth two (2) times a day.   gabapentin (NEURONTIN) 300 mg capsule   No Yes   Sig: Take 1 Cap by mouth nightly.       Facility-Administered Medications: None       Hospital Medications:  Current Facility-Administered Medications   Medication Dose Route Frequency    0.9% sodium chloride infusion  100 mL/hr IntraVENous CONTINUOUS    sodium chloride (NS) flush 5-10 mL  5-10 mL IntraVENous Q8H    sodium chloride (NS) flush 5-10 mL  5-10 mL IntraVENous PRN    midazolam (VERSED) injection 0.25-10 mg  0.25-10 mg IntraVENous Multiple    fentaNYL citrate (PF) injection 100 mcg  100 mcg IntraVENous Multiple    naloxone (NARCAN) injection 0.4 mg  0.4 mg IntraVENous Multiple    flumazenil (ROMAZICON) 0.1 mg/mL injection 0.2 mg  0.2 mg IntraVENous Multiple    simethicone (MYLICON) 22HJ/1.2UE oral drops 80 mg  1.2 mL Oral Multiple    diphenhydrAMINE (BENADRYL) injection 50 mg  50 mg IntraVENous ONCE    atropine injection 0.5 mg  0.5 mg IntraVENous ONCE PRN    EPINEPHrine (ADRENALIN) 0.1 mg/mL syringe 1 mg  1 mg Endoscopically ONCE PRN     Facility-Administered Medications Ordered in Other Encounters   Medication Dose Route Frequency    0.9% sodium chloride infusion   IntraVENous CONTINUOUS       Social History:  Social History   Substance Use Topics    Smoking status: Former Smoker    Smokeless tobacco: Never Used      Comment: quit over 30 yrs ago    Alcohol use 0.6 oz/week     1 Shots of liquor per week       Family History:  Family History   Problem Relation Age of Onset    COPD Father     Stomach Cancer Maternal Grandfather              Review of Systems:      Constitutional: negative fever, negative chills, negative weight loss  Eyes:   negative visual changes  ENT:   negative sore throat, tongue or lip swelling  Respiratory:  negative cough, negative dyspnea  Cards:  negative for chest pain, palpitations, lower extremity edema  GI:   See HPI  :  negative for frequency, dysuria  Integument:  negative for rash and pruritus  Heme:  negative for easy bruising and gum/nose bleeding  Musculoskel: negative for myalgias,  back pain and muscle weakness  Neuro: negative for headaches, dizziness, vertigo  Psych:  negative for feelings of anxiety, depression       Objective:   Patient Vitals for the past 8 hrs:   BP Temp Pulse Resp SpO2 Height Weight   07/19/18 0933 134/76 97.6 °F (36.4 °C) 69 (!) 7 98 % 6' 1\" (1.854 m) 78.9 kg (174 lb)             EXAM:     NEURO-a&o   HEENT-wnl   LUNGS-clear    COR-regular rate and rhythym     ABD-soft , no tenderness, no rebound, good bs     EXT-no edema     Data Review     No results for input(s): WBC, HGB, HCT, PLT, HGBEXT, HCTEXT, PLTEXT in the last 72 hours. No results for input(s): NA, K, CL, CO2, BUN, CREA, GLU, PHOS, CA in the last 72 hours. No results for input(s): SGOT, GPT, AP, TBIL, TP, ALB, GLOB, GGT, AML, LPSE in the last 72 hours. No lab exists for component: AMYP, HLPSE  No results for input(s): INR, PTP, APTT in the last 72 hours. No lab exists for component: INREXT       Assessment:   · Personal history of colon polyps     Patient Active Problem List   Diagnosis Code    HLD (hyperlipidemia) E78.5    Osteopenia M85.80    Spinal stenosis M48.00    Hiatal hernia K44.9    Schatzki's ring K22.2    Neuropathic pain, leg, right G57.91     Plan:   · Endoscopic procedure with MAC     Signed By: Jhon Dickens.  Remigio Martinez MD     7/19/2018  9:45 AM normal...

## 2018-07-19 NOTE — ANESTHESIA PREPROCEDURE EVALUATION
Anesthetic History   No history of anesthetic complications            Review of Systems / Medical History  Patient summary reviewed, nursing notes reviewed and pertinent labs reviewed    Pulmonary  Within defined limits                 Neuro/Psych   Within defined limits           Cardiovascular  Within defined limits                     GI/Hepatic/Renal  Within defined limits              Endo/Other  Within defined limits           Other Findings              Physical Exam    Airway  Mallampati: II  TM Distance: > 6 cm  Neck ROM: normal range of motion   Mouth opening: Normal     Cardiovascular  Regular rate and rhythm,  S1 and S2 normal,  no murmur, click, rub, or gallop             Dental    Dentition: Caps/crowns     Pulmonary  Breath sounds clear to auscultation               Abdominal  GI exam deferred       Other Findings            Anesthetic Plan    ASA: 1  Anesthesia type: MAC          Induction: Intravenous  Anesthetic plan and risks discussed with: Patient

## 2018-08-16 ENCOUNTER — OFFICE VISIT (OUTPATIENT)
Dept: FAMILY MEDICINE CLINIC | Age: 68
End: 2018-08-16

## 2018-08-16 VITALS
SYSTOLIC BLOOD PRESSURE: 130 MMHG | HEART RATE: 74 BPM | RESPIRATION RATE: 18 BRPM | TEMPERATURE: 97.9 F | BODY MASS INDEX: 22.77 KG/M2 | OXYGEN SATURATION: 98 % | DIASTOLIC BLOOD PRESSURE: 78 MMHG | HEIGHT: 73 IN | WEIGHT: 171.8 LBS

## 2018-08-16 DIAGNOSIS — J32.9 SINUSITIS, UNSPECIFIED CHRONICITY, UNSPECIFIED LOCATION: Primary | ICD-10-CM

## 2018-08-16 RX ORDER — AMOXICILLIN AND CLAVULANATE POTASSIUM 875; 125 MG/1; MG/1
1 TABLET, FILM COATED ORAL EVERY 12 HOURS
Qty: 10 TAB | Refills: 0 | Status: SHIPPED | OUTPATIENT
Start: 2018-08-16 | End: 2018-08-21

## 2018-08-16 NOTE — PROGRESS NOTES
Chief Complaint   Patient presents with    Sinus Infection     x 2 weeks - congestion, pressure     1. Have you been to the ER, urgent care clinic since your last visit? Hospitalized since your last visit? No    2. Have you seen or consulted any other health care providers outside of the [address] [place] [address] since your last visit? Include any pap smears or colon screening.  No

## 2018-08-16 NOTE — MR AVS SNAPSHOT
Euhayder Idol 
 
 
 [address] [place] 
[phone] Patient: Thompson Daly MRN: [id number] IER:8[birth year]09/[birth year]496 Visit Information Date & Time Provider Department Dept. Phone Encounter #  
 8/16/2018  9:00 AM Honey Charles  [facility] [phone] [id number] Follow-up Instructions Return in about 6 months (around 2/16/2019), or if symptoms worsen or fail to improve, for HTN follow up. Your Appointments 9/13/2018  8:15 AM  
ROUTINE CARE with Honey Charles MD  
[facility]) Appt Note: 3 months follow up  
 [address] [phone]  
[phone]  
  
   
 [address] [phone] Upcoming Health Maintenance Date Due  
 GLAUCOMA SCREENING Q2Y 7/19/2016 Influenza Age 5 to Adult 8/1/2018 MEDICARE YEARLY EXAM 6/9/2019 DTaP/Tdap/Td series (2 - Td) 2/27/2023 COLONOSCOPY 7/19/2023 Allergies as of 8/16/2018  Review Complete On: 8/16/2018 By: Kenzie Alatorre No Known Allergies Current Immunizations  Never Reviewed Name Date Hep B Vaccine 8/6/2014, 3/5/2014, 2/6/2014 Influenza High Dose Vaccine PF 10/17/2016 Influenza Vaccine 10/17/2016, 10/14/2015, 10/6/2014, 9/17/2013, 11/1/2012 Pneumococcal Conjugate (PCV-13) 1/12/2017 Pneumococcal Vaccine (Unspecified Type) 2/8/2018 Tdap 2/27/2013 Zoster Vaccine, Live 2/27/2013 Not reviewed this visit You Were Diagnosed With   
  
 Codes Comments Sinusitis, unspecified chronicity, unspecified location    -  Primary ICD-10-CM: J32.9 ICD-9-CM: 473.9 Vitals BP Pulse Temp Resp Height(growth percentile) Weight(growth percentile) 130/78 (BP 1 Location: Left arm, BP Patient Position: Sitting) 74 97.9 °F (36.6 °C) (Oral) 18 6' 1\" (1.854 m) 171 lb 12.8 oz (77.9 kg) SpO2 BMI Smoking Status 98% 22.67 kg/m2 Former Smoker Vitals History BMI and BSA Data Body Mass Index Body Surface Area  
 22.67 kg/m 2 2 m 2 Preferred Pharmacy Pharmacy Name Phone [facility] [address] [facility], [address] [phone] Your Updated Medication List  
  
   
This list is accurate as of 8/16/18  9:36 AM.  Always use your most recent med list.  
  
  
  
  
 alendronate 70 mg tablet Commonly known as:  FOSAMAX Take 1 Tab by mouth every seven (7) days. Indications: OSTEOPOROSIS IN MALE PATIENT  
  
 amoxicillin-clavulanate 875-125 mg per tablet Commonly known as:  AUGMENTIN Take 1 Tab by mouth every twelve (12) hours for 5 days. ASPIRIN PO Take 81 mg by mouth daily. atorvastatin 10 mg tablet Commonly known as:  LIPITOR Take 1 Tab by mouth daily. CALCIUM + D PO Take 600 mg by mouth two (2) times a day.  
  
 gabapentin 300 mg capsule Commonly known as:  NEURONTIN Take 1 Cap by mouth nightly. PROBIOTIC PO Take  by mouth. Takes one po once daily. VITAMIN B COMPLEX PO Take  by mouth. Takes one po once daily. VITAMIN C 500 mg tablet Generic drug:  ascorbic acid (vitamin C) Take 500 mg by mouth daily. Prescriptions Sent to Pharmacy Refills  
 amoxicillin-clavulanate (AUGMENTIN) 875-125 mg per tablet 0 Sig: Take 1 Tab by mouth every twelve (12) hours for 5 days. Class: Normal  
 [facility]acy: [facility] [address] [place] [address], [address] Ph #: [phone] Route: Oral  
  
Follow-up Instructions Return in about 6 months (around 2/16/2019), or if symptoms worsen or fail to improve, for HTN follow up. Patient Instructions Sinusitis: Care Instructions Your Care Instructions Sinusitis is an infection of the lining of the sinus cavities in your head. Sinusitis often follows a cold. It causes pain and pressure in your head and face. In most cases, sinusitis gets better on its own in 1 to 2 weeks. But some mild symptoms may last for several weeks. Sometimes antibiotics are needed. Follow-up care is a key part of your treatment and safety. Be sure to make and go to all appointments, and call your doctor if you are having problems. It's also a good idea to know your test results and keep a list of the medicines you take. How can you care for yourself at home? · Take an over-the-counter pain medicine, such as acetaminophen (Tylenol), ibuprofen (Advil, Motrin), or naproxen (Aleve). Read and follow all instructions on the label. · If the doctor prescribed antibiotics, take them as directed. Do not stop taking them just because you feel better. You need to take the full course of antibiotics. · Be careful when taking over-the-counter cold or flu medicines and Tylenol at the same time. Many of these medicines have acetaminophen, which is Tylenol. Read the labels to make sure that you are not taking more than the recommended dose. Too much acetaminophen (Tylenol) can be harmful. · Breathe warm, moist air from a steamy shower, a hot bath, or a sink filled with hot water. Avoid cold, dry air. Using a humidifier in your home may help. Follow the directions for cleaning the machine. · Use saline (saltwater) nasal washes to help keep your nasal passages open and wash out mucus and bacteria. You can buy saline nose drops at a grocery store or drugstore. Or you can make your own at home by adding 1 teaspoon of salt and 1 teaspoon of baking soda to 2 cups of distilled water. If you make your own, fill a bulb syringe with the solution, insert the tip into your nostril, and squeeze gently. Helon Christine your nose. · Put a hot, wet towel or a warm gel pack on your face 3 or 4 times a day for 5 to 10 minutes each time. · Try a decongestant nasal spray like oxymetazoline (Afrin).  Do not use it for more than 3 days in a row. Using it for more than 3 days can make your congestion worse. When should you call for help? Call your doctor now or seek immediate medical care if: 
  · You have new or worse swelling or redness in your face or around your eyes.  
  · You have a new or higher fever.  
 Watch closely for changes in your health, and be sure to contact your doctor if: 
  · You have new or worse facial pain.  
  · The mucus from your nose becomes thicker (like pus) or has new blood in it.  
  · You are not getting better as expected. Where can you learn more? Go to http://sarah-urszula.info/. Enter D236 in the search box to learn more about \"Sinusitis: Care Instructions. \" Current as of: May 12, 2017 Content Version: 11.7 © 3107-3823 [employer]. Care instructions adapted under license by [employer] (which disclaims liability or warranty for this information). If you have questions about a medical condition or this instruction, always ask your healthcare professional. [place] [address] any warranty or liability for your use of this information. Introducing [facility]! Dear Zaria Muñiz: Thank you for requesting a ChipIn account. Our records indicate that you already have an active ChipIn account. You can access your account anytime at [email or web address] Did you know that you can access your hospital and ER discharge instructions at any time in ChipIn? You can also review all of your test results from your hospital stay or ER visit. Additional Information If you have questions, please visit the Frequently Asked Questions section of the ChipIn website at [email or web address] Remember, ChipIn is NOT to be used for urgent needs. For medical emergencies, dial 911. Now available from your iPhone and Android! Please provide this summary of care documentation to your next provider. Your primary care clinician is listed as Ke Douglas. If you have any questions after today's visit, please call [phone].

## 2018-08-16 NOTE — PROGRESS NOTES
Patient Name: Silvia Coyle   MRN: [id number]    Pablito Mcmahon is a 80 y.o. male who presents with the following:     Patient reports sore throat, nasal blockage, post nasal drip, headache, bilateral sinus pain and URI symptoms for 2 weeks, unchanged since that time. Denies a history of fever, chills, chest congestion, wheezing, SOB/HEATH and chest pain. Evaluation to date: none. Treatment to date: OTC products. Relevant PMH: Sinusitis. Patient reports sick contacts: no.   Last sinus infection was 2 or 3 years ago. Review of Systems   Constitutional: Negative for fever, malaise/fatigue and weight loss. HENT: Positive for congestion, sinus pain and sore throat. Negative for ear discharge, ear pain and hearing loss. Respiratory: Negative for cough, hemoptysis, shortness of breath and wheezing. Cardiovascular: Negative for chest pain, palpitations, leg swelling and PND. Gastrointestinal: Negative for abdominal pain, constipation, diarrhea, nausea and vomiting. The patient's medications, allergies, past medical history, surgical history, family history and social history were reviewed and updated where appropriate. Prior to Admission medications    Medication Sig Start Date End Date Taking? Authorizing Provider   ASPIRIN PO Take 81 mg by mouth daily. Yes Historical Provider   calcium carbonate/vitamin D3 (CALCIUM + D PO) Take 600 mg by mouth two (2) times a day. Yes Historical Provider   ascorbic acid, vitamin C, (VITAMIN C) 500 mg tablet Take 500 mg by mouth daily. Yes Historical Provider   VITAMIN B COMPLEX PO Take  by mouth. Takes one po once daily. Yes Historical Provider   Lactobacillus acidophilus (PROBIOTIC PO) Take  by mouth. Takes one po once daily. Yes Historical Provider   gabapentin (NEURONTIN) 300 mg capsule Take 1 Cap by mouth nightly. 6/12/18  Yes Sabine Tam MD   atorvastatin (LIPITOR) 10 mg tablet Take 1 Tab by mouth daily.  6/12/18  Yes Ke MD Stella   alendronate (FOSAMAX) 70 mg tablet Take 1 Tab by mouth every seven (7) days. Indications: OSTEOPOROSIS IN MALE PATIENT 6/12/18  Yes Abimbola Rodarte MD       No Known Allergies        OBJECTIVE    Visit Vitals    /78 (BP 1 Location: Left arm, BP Patient Position: Sitting)    Pulse 74    Temp 97.9 °F (36.6 °C) (Oral)    Resp 18    Ht 6' 1\" (1.854 m)    Wt 171 lb 12.8 oz (77.9 kg)    SpO2 98%    BMI 22.67 kg/m2       Physical Exam   Constitutional: He is oriented to person, place, and time and well-developed, well-nourished, and in no distress. No distress. HENT:   Head: Normocephalic and atraumatic. Right Ear: Tympanic membrane is not perforated and not erythematous. No middle ear effusion. No decreased hearing is noted. Left Ear: Tympanic membrane is not perforated and not erythematous. No middle ear effusion. No decreased hearing is noted. Nose: Right sinus exhibits maxillary sinus tenderness. Right sinus exhibits no frontal sinus tenderness. Left sinus exhibits maxillary sinus tenderness. Left sinus exhibits no frontal sinus tenderness. Mouth/Throat: Uvula is midline, oropharynx is clear and moist and mucous membranes are normal.   Neck: Normal range of motion. Neck supple. Cardiovascular: Normal rate, regular rhythm and normal heart sounds. Exam reveals no gallop and no friction rub. No murmur heard. Pulmonary/Chest: Effort normal and breath sounds normal. No respiratory distress. He has no wheezes. Lymphadenopathy:     He has no cervical adenopathy. Neurological: He is alert and oriented to person, place, and time. Skin: He is not diaphoretic. Psychiatric: Mood, memory, affect and judgment normal.   Nursing note and vitals reviewed. ASSESSMENT AND PLAN  Gadiel Felton is a 80 y.o. male who presents today for:    1. Sinusitis, unspecified chronicity, unspecified location  discussed diagnosis & treatment options, likely bacterial at this time. Will start meds below, red flags were reviewed with the patient to RTC or notify me. Expected time course for resolution reviewed with patient. - amoxicillin-clavulanate (AUGMENTIN) 875-125 mg per tablet; Take 1 Tab by mouth every twelve (12) hours for 5 days. Dispense: 10 Tab; Refill: 0       There are no discontinued medications. Follow-up Disposition:  Return in about 6 months (around 2/16/2019), or if symptoms worsen or fail to improve, for HTN follow up. Medication risks/benefits/costs/interactions/alternatives discussed with patient. Advised patient to call back or return to office if symptoms worsen/change/persist. If patient cannot reach us or should anything more severe/urgent arise he/she should proceed directly to the nearest emergency department. Discussed expected course/resolution/complications of diagnosis in detail with patient. Patient given a written after visit summary which includes his/her diagnoses, current medications and vitals. Patient expressed understanding with the diagnosis and plan.      Corey Alcazar M.D.

## 2018-08-16 NOTE — PATIENT INSTRUCTIONS
Sinusitis: Care Instructions  Your Care Instructions    Sinusitis is an infection of the lining of the sinus cavities in your head. Sinusitis often follows a cold. It causes pain and pressure in your head and face. In most cases, sinusitis gets better on its own in 1 to 2 weeks. But some mild symptoms may last for several weeks. Sometimes antibiotics are needed. Follow-up care is a key part of your treatment and safety. Be sure to make and go to all appointments, and call your doctor if you are having problems. It's also a good idea to know your test results and keep a list of the medicines you take. How can you care for yourself at home? · Take an over-the-counter pain medicine, such as acetaminophen (Tylenol), ibuprofen (Advil, Motrin), or naproxen (Aleve). Read and follow all instructions on the label. · If the doctor prescribed antibiotics, take them as directed. Do not stop taking them just because you feel better. You need to take the full course of antibiotics. · Be careful when taking over-the-counter cold or flu medicines and Tylenol at the same time. Many of these medicines have acetaminophen, which is Tylenol. Read the labels to make sure that you are not taking more than the recommended dose. Too much acetaminophen (Tylenol) can be harmful. · Breathe warm, moist air from a steamy shower, a hot bath, or a sink filled with hot water. Avoid cold, dry air. Using a humidifier in your home may help. Follow the directions for cleaning the machine. · Use saline (saltwater) nasal washes to help keep your nasal passages open and wash out mucus and bacteria. You can buy saline nose drops at a grocery store or drugstore. Or you can make your own at home by adding 1 teaspoon of salt and 1 teaspoon of baking soda to 2 cups of distilled water. If you make your own, fill a bulb syringe with the solution, insert the tip into your nostril, and squeeze gently. Helon Christine your nose.   · Put a hot, wet towel or a warm gel pack on your face 3 or 4 times a day for 5 to 10 minutes each time. · Try a decongestant nasal spray like oxymetazoline (Afrin). Do not use it for more than 3 days in a row. Using it for more than 3 days can make your congestion worse. When should you call for help? Call your doctor now or seek immediate medical care if:    · You have new or worse swelling or redness in your face or around your eyes.     · You have a new or higher fever.    Watch closely for changes in your health, and be sure to contact your doctor if:    · You have new or worse facial pain.     · The mucus from your nose becomes thicker (like pus) or has new blood in it.     · You are not getting better as expected. Where can you learn more? Go to http://sarah-urszula.info/. Enter K005 in the search box to learn more about \"Sinusitis: Care Instructions. \"  Current as of: May 12, 2017  Content Version: 11.7  © 7026-1110 [employer]. Care instructions adapted under license by [employer] (which disclaims liability or warranty for this information). If you have questions about a medical condition or this instruction, always ask your healthcare professional. [address] any warranty or liability for your use of this information.

## 2019-02-15 ENCOUNTER — OFFICE VISIT (OUTPATIENT)
Dept: FAMILY MEDICINE CLINIC | Age: 69
End: 2019-02-15

## 2019-02-15 VITALS
TEMPERATURE: 98 F | HEIGHT: 73 IN | DIASTOLIC BLOOD PRESSURE: 74 MMHG | BODY MASS INDEX: 23.09 KG/M2 | WEIGHT: 174.2 LBS | OXYGEN SATURATION: 97 % | RESPIRATION RATE: 18 BRPM | HEART RATE: 70 BPM | SYSTOLIC BLOOD PRESSURE: 126 MMHG

## 2019-02-15 DIAGNOSIS — M85.80 OSTEOPENIA, UNSPECIFIED LOCATION: Primary | ICD-10-CM

## 2019-02-15 DIAGNOSIS — E78.5 HYPERLIPIDEMIA, UNSPECIFIED HYPERLIPIDEMIA TYPE: ICD-10-CM

## 2019-02-15 RX ORDER — NABUMETONE 750 MG/1
750 TABLET, FILM COATED ORAL AS NEEDED
COMMUNITY
End: 2020-02-06

## 2019-02-15 RX ORDER — LANOLIN ALCOHOL/MO/W.PET/CERES
400 CREAM (GRAM) TOPICAL DAILY
COMMUNITY
End: 2020-02-06

## 2019-02-15 RX ORDER — TRIAMCINOLONE ACETONIDE 1 MG/G
OINTMENT TOPICAL 2 TIMES DAILY
COMMUNITY
End: 2020-02-06 | Stop reason: SDUPTHER

## 2019-02-15 NOTE — PROGRESS NOTES
Chief Complaint Patient presents with  Hypertension 1. Have you been to the ER, urgent care clinic since your last visit? Hospitalized since your last visit? No 
 
2. Have you seen or consulted any other health care providers outside of the [address] [place] [address] since your last visit? Include any pap smears or colon screening.  No

## 2019-02-15 NOTE — PATIENT INSTRUCTIONS
Learning About Low Bone Density What is low bone density? Low bone density (sometimes called osteopenia) is a decrease in thickness, or density, in bones. That means the bones become thinner and weaker. It is much more common in women than in men. It is an early form of osteoporosis, a condition in which the bones are so thin and weak that they can break easily. It's important to know that low bone density is not a disease. It can happen normally with aging. Having low bone density means that there is a greater risk that you may get osteoporosis. It also means that you are more likely to break a bone than someone who does not have low bone density. But not everyone with low bone density gets osteoporosis or breaks a bone. Low bone density doesn't cause any symptoms. It's usually found with a type of X-ray called a bone density test. Low bone density means that your bone density result (T-score) is between 1.0 and 2.5. What increases your risk for low bone density? Things that increase your risk include: 
· Having a family history of osteoporosis. · Being thin. · Being white or . · Getting too little physical activity. · Smoking. · Drinking too much alcohol often. · Using certain medicines such as steroids. How can you prevent osteoporosis? There are things you can do to help prevent osteoporosis. Certain lifestyle changes will help slow the loss of bone density. · Eat food that has plenty of calcium and vitamin D. Yogurt, cheese, milk, and dark green vegetables are high in calcium. Eggs, fatty fish, cereal, and fortified milk are high in vitamin D. 
· Talk to your doctor about taking a calcium supplement that has vitamin D in it. · Get regular exercise. ? Do 30 minutes of weight-bearing exercise on most days of the week. Walking, jogging, stair climbing, and dancing are good choices. ? Do resistance exercises with weights or elastic bands 2 or 3 days a week. · Limit alcohol to 2 drinks a day for men and 1 drink a day for women. Too much alcohol can cause health problems. · Do not smoke. Smoking can make bones thin faster. If you need help quitting, talk to your doctor about stop-smoking programs and medicines. These can increase your chances of quitting for good. Prescription medicines are available for treating low bone density. But these are more often used to treat osteoporosis. Follow-up care is a key part of your treatment and safety. Be sure to make and go to all appointments, and call your doctor if you are having problems. It's also a good idea to know your test results and keep a list of the medicines you take. Where can you learn more? Go to http://sarah-urszula.info/. Enter J846 in the search box to learn more about \"Learning About Low Bone Density. \" Current as of: March 15, 2018 Content Version: 11.9 © 3695-5807 [employer]. Care instructions adapted under license by [employer] (which disclaims liability or warranty for this information). If you have questions about a medical condition or this instruction, always ask your healthcare professional. [place] [address] any warranty or liability for your use of this information.

## 2019-02-15 NOTE — PROGRESS NOTES
Patient Name: Vale Looney MRN: [id number] SUBJECTIVE Vale Looney is a 80 y.o. male who presents with the following: Hx of HLD, on statin. States he had a AAA U/S done after after 65 years which showed plaque. No aneurysms reportedly. Quit smoking in 1980. Due for DEXA scan. Has been taking Fosamax for more than 5 years for osteopenia and compression fracture. Review of Systems Constitutional: Negative for fever, malaise/fatigue and weight loss. Respiratory: Negative for cough, hemoptysis, shortness of breath and wheezing. Cardiovascular: Negative for chest pain, palpitations, leg swelling and PND. Gastrointestinal: Negative for abdominal pain, constipation, diarrhea, nausea and vomiting. The patient's medications, allergies, past medical history, surgical history, family history and social history were reviewed and updated where appropriate. Prior to Admission medications Medication Sig Start Date End Date Taking? Authorizing Provider  
triamcinolone acetonide (KENALOG) 0.1 % ointment Apply  to affected area two (2) times a day. use thin layer   Yes Provider, Historical  
nabumetone (RELAFEN) 750 mg tablet Take 750 mg by mouth as needed for Pain. Yes Provider, Historical  
magnesium oxide (MAG-OX) 400 mg tablet Take 400 mg by mouth daily. Yes Provider, Historical  
alendronate (FOSAMAX) 70 mg tablet TAKE 1 TABLET EVERY SEVEN DAYS 11/15/18  Yes Lexa Yarbrough MD  
atorvastatin (LIPITOR) 10 mg tablet TAKE 1 TABLET DAILY 11/15/18  Yes Lexa Yarbrough MD  
ASPIRIN PO Take 81 mg by mouth daily. Yes Provider, Historical  
calcium carbonate/vitamin D3 (CALCIUM + D PO) Take 600 mg by mouth two (2) times a day. Yes Provider, Historical  
ascorbic acid, vitamin C, (VITAMIN C) 500 mg tablet Take 500 mg by mouth daily. Yes Provider, Historical  
VITAMIN B COMPLEX PO Take  by mouth. Takes one po once daily.    Yes Provider, Historical  
 Lactobacillus acidophilus (PROBIOTIC PO) Take  by mouth. Takes one po once daily. Yes Provider, Historical  
gabapentin (NEURONTIN) 300 mg capsule Take 1 Cap by mouth nightly. 6/12/18   Lyla Reed MD  
 
 
No Known Allergies OBJECTIVE Visit Vitals /74 (BP 1 Location: Right arm, BP Patient Position: Sitting) Pulse 70 Temp 98 °F (36.7 °C) (Oral) Resp 18 Ht 6' 1\" (1.854 m) Wt 174 lb 3.2 oz (79 kg) SpO2 97% BMI 22.98 kg/m² Physical Exam  
Constitutional: He is oriented to person, place, and time and well-developed, well-nourished, and in no distress. No distress. Eyes: Conjunctivae and EOM are normal. Pupils are equal, round, and reactive to light. Cardiovascular: Normal rate, regular rhythm and normal heart sounds. Exam reveals no gallop and no friction rub. No murmur heard. Pulmonary/Chest: Effort normal and breath sounds normal. No respiratory distress. He has no wheezes. Neurological: He is alert and oriented to person, place, and time. Skin: Skin is warm and dry. No rash noted. He is not diaphoretic. Psychiatric: Mood, memory, affect and judgment normal.  
Nursing note and vitals reviewed. ASSESSMENT AND PLAN Harrison Rockwell is a 80 y.o. male who presents today for: 
 
1. Osteopenia, unspecified location Likely will stop Fosamax for drug holiday. - DEXA BONE DENSITY STUDY AXIAL; Future 2. Hyperlipidemia, unspecified hyperlipidemia type Stable, continue current treatment. Medications Discontinued During This Encounter Medication Reason  gabapentin (NEURONTIN) 300 mg capsule Follow-up Disposition: 
Return in about 6 months (around 8/15/2019) for Medicare Wellness Visit (30 min). Medication risks/benefits/costs/interactions/alternatives discussed with patient.  
Advised patient to call back or return to office if symptoms worsen/change/persist. If patient cannot reach us or should anything more severe/urgent arise he/she should proceed directly to the nearest emergency department. Discussed expected course/resolution/complications of diagnosis in detail with patient. Patient given a written after visit summary which includes his/her diagnoses, current medications and vitals. Patient expressed understanding with the diagnosis and plan. Ke Stone M.D.

## 2019-03-01 RX ORDER — NABUMETONE 750 MG/1
750 TABLET, FILM COATED ORAL AS NEEDED
Qty: 30 TAB | Refills: 1 | OUTPATIENT
Start: 2019-03-01

## 2019-03-01 NOTE — TELEPHONE ENCOUNTER
Patient request a refill on the medication nabumetone 750mg. From  Daria Gaffney To  [facility] Nurse Pool Sent  3/1/2019  7:19 AM   ----- Message from [employer], Generic sent at 3/1/2019  7:19 AM EST -----     Dr. Fabrice Lazaro,   Would you please order a refill for my nabumetone 750 mg? I have been taking one pill at bedtime as it helps control the arthritic pain in my shoulder. Alternatively, would you rather I take something that is over the counter? If you want me to continue the nabumetone, please order it through [employer].    Thank you,   Lexine Curling

## 2019-04-02 ENCOUNTER — TELEPHONE (OUTPATIENT)
Dept: FAMILY MEDICINE CLINIC | Age: 69
End: 2019-04-02

## 2019-04-02 NOTE — TELEPHONE ENCOUNTER
Outbound call to Larisa at [facility]. Notified ok per verbal order Dr. Benitez Smith to switch locations for Dexa Scan. Larisa verbalized understanding.

## 2019-04-02 NOTE — TELEPHONE ENCOUNTER
----- Message from Tomsa Haynes sent at 4/2/2019 12:18 PM EDT -----  Regarding: Aysha/telephone  Cheryle with [facility] stated the pt changed the location for his bone scan. He is having his bone scan tomorrow 4/3/19 at [facility] and she is requesting a verbal order. She did not know what time it was tomorrow. [address] [place] [address] number is [phone].

## 2019-04-03 ENCOUNTER — HOSPITAL ENCOUNTER (OUTPATIENT)
Dept: BONE DENSITY | Age: 69
Discharge: HOME | End: 2019-04-03
Attending: FAMILY MEDICINE
Payer: MEDICARE

## 2019-04-03 DIAGNOSIS — M85.80 OSTEOPENIA, UNSPECIFIED LOCATION: ICD-10-CM

## 2019-04-03 PROCEDURE — 77080 DXA BONE DENSITY AXIAL: CPT

## 2019-04-12 NOTE — PROGRESS NOTES
Dear  Tanya Shawanda,    I hope you are doing well. I wanted to follow up on your recent test results: Your bone scan shows osteopenia, which is the precursor to osteoporosis. I recommend that you stop the Fosamax at this time as it has been about 5 years since you started it. In the meantime, it is recommended to do weight-bearing exercises, avoid smoking, and to get at least vitamin D3 800 units/day and calcium at 1500 mg/day. We can plan to do another bone scan in 2 to 3 years. Please let me know if you have any questions.

## 2019-08-13 RX ORDER — ATORVASTATIN CALCIUM 10 MG/1
TABLET, FILM COATED ORAL
Qty: 90 TAB | Refills: 1 | Status: SHIPPED | OUTPATIENT
Start: 2019-08-13 | End: 2020-02-06 | Stop reason: SDUPTHER

## 2020-01-07 ENCOUNTER — PATIENT MESSAGE (OUTPATIENT)
Dept: FAMILY MEDICINE CLINIC | Age: 70
End: 2020-01-07

## 2020-01-07 DIAGNOSIS — E78.5 HYPERLIPIDEMIA, UNSPECIFIED HYPERLIPIDEMIA TYPE: Primary | ICD-10-CM

## 2020-01-07 DIAGNOSIS — M85.80 OSTEOPENIA, UNSPECIFIED LOCATION: ICD-10-CM

## 2020-01-28 LAB
25(OH)D3+25(OH)D2 SERPL-MCNC: 54.8 NG/ML (ref 30–100)
ALBUMIN SERPL-MCNC: 4.1 G/DL (ref 3.8–4.8)
ALBUMIN/GLOB SERPL: 1.6 {RATIO} (ref 1.2–2.2)
ALP SERPL-CCNC: 72 IU/L (ref 39–117)
ALT SERPL-CCNC: 17 IU/L (ref 0–44)
AST SERPL-CCNC: 20 IU/L (ref 0–40)
BILIRUB SERPL-MCNC: 0.6 MG/DL (ref 0–1.2)
BUN SERPL-MCNC: 16 MG/DL (ref 8–27)
BUN/CREAT SERPL: 14 (ref 10–24)
CALCIUM SERPL-MCNC: 9.3 MG/DL (ref 8.6–10.2)
CHLORIDE SERPL-SCNC: 102 MMOL/L (ref 96–106)
CHOLEST SERPL-MCNC: 142 MG/DL (ref 100–199)
CO2 SERPL-SCNC: 24 MMOL/L (ref 20–29)
CREAT SERPL-MCNC: 1.12 MG/DL (ref 0.76–1.27)
ERYTHROCYTE [DISTWIDTH] IN BLOOD BY AUTOMATED COUNT: 12.9 % (ref 11.6–15.4)
GLOBULIN SER CALC-MCNC: 2.5 G/DL (ref 1.5–4.5)
GLUCOSE SERPL-MCNC: 94 MG/DL (ref 65–99)
HCT VFR BLD AUTO: 40 % (ref 37.5–51)
HDLC SERPL-MCNC: 73 MG/DL
HGB BLD-MCNC: 14.6 G/DL (ref 13–17.7)
INTERPRETATION, 910389: NORMAL
LDLC SERPL CALC-MCNC: 60 MG/DL (ref 0–99)
MCH RBC QN AUTO: 34 PG (ref 26.6–33)
MCHC RBC AUTO-ENTMCNC: 36.5 G/DL (ref 31.5–35.7)
MCV RBC AUTO: 93 FL (ref 79–97)
MORPHOLOGY BLD-IMP: ABNORMAL
PLATELET # BLD AUTO: 221 X10E3/UL (ref 150–450)
POTASSIUM SERPL-SCNC: 4.2 MMOL/L (ref 3.5–5.2)
PROT SERPL-MCNC: 6.6 G/DL (ref 6–8.5)
RBC # BLD AUTO: 4.29 X10E6/UL (ref 4.14–5.8)
SODIUM SERPL-SCNC: 140 MMOL/L (ref 134–144)
TRIGL SERPL-MCNC: 45 MG/DL (ref 0–149)
VLDLC SERPL CALC-MCNC: 9 MG/DL (ref 5–40)
WBC # BLD AUTO: 6.2 X10E3/UL (ref 3.4–10.8)

## 2020-02-06 ENCOUNTER — OFFICE VISIT (OUTPATIENT)
Dept: FAMILY MEDICINE CLINIC | Age: 70
End: 2020-02-06

## 2020-02-06 VITALS
BODY MASS INDEX: 22.56 KG/M2 | DIASTOLIC BLOOD PRESSURE: 70 MMHG | TEMPERATURE: 97.8 F | RESPIRATION RATE: 17 BRPM | HEIGHT: 73 IN | OXYGEN SATURATION: 95 % | HEART RATE: 65 BPM | SYSTOLIC BLOOD PRESSURE: 130 MMHG | WEIGHT: 170.2 LBS

## 2020-02-06 DIAGNOSIS — Z00.00 MEDICARE ANNUAL WELLNESS VISIT, SUBSEQUENT: Primary | ICD-10-CM

## 2020-02-06 DIAGNOSIS — E78.5 HYPERLIPIDEMIA, UNSPECIFIED HYPERLIPIDEMIA TYPE: ICD-10-CM

## 2020-02-06 DIAGNOSIS — L30.9 ECZEMA, UNSPECIFIED TYPE: ICD-10-CM

## 2020-02-06 RX ORDER — ATORVASTATIN CALCIUM 10 MG/1
TABLET, FILM COATED ORAL
Qty: 90 TAB | Refills: 3 | Status: SHIPPED | OUTPATIENT
Start: 2020-02-06 | End: 2021-02-22

## 2020-02-06 RX ORDER — TRIAMCINOLONE ACETONIDE 1 MG/G
OINTMENT TOPICAL
Qty: 30 G | Refills: 0 | Status: SHIPPED | OUTPATIENT
Start: 2020-02-06 | End: 2022-05-31

## 2020-02-06 NOTE — PROGRESS NOTES
Patient Name: Montserrat Rees   MRN: [id number]    Gregorio Stephenson is a 77 y.o. male who presents with the following:     Cholesterol well controlled on statin. Requesting refill of Kenalog cream for eczema on neck. Had lumbar back surgery in December; recovering well. Review of Systems   Constitutional: Negative for fever, malaise/fatigue and weight loss. Respiratory: Negative for cough, hemoptysis, shortness of breath and wheezing. Cardiovascular: Negative for chest pain, palpitations, leg swelling and PND. Gastrointestinal: Negative for abdominal pain, constipation, diarrhea, nausea and vomiting. The patient's medications, allergies, past medical history, surgical history, family history and social history were reviewed and updated where appropriate. Prior to Admission medications    Medication Sig Start Date End Date Taking? Authorizing Provider   atorvastatin (LIPITOR) 10 mg tablet TAKE 1 TABLET DAILY 8/13/19  Yes Kate Segovia NP   triamcinolone acetonide (KENALOG) 0.1 % ointment Apply  to affected area two (2) times a day. use thin layer   Yes Provider, Historical   ASPIRIN PO Take 81 mg by mouth daily. Yes Provider, Historical   calcium carbonate/vitamin D3 (CALCIUM + D PO) Take 600 mg by mouth two (2) times a day. Yes Provider, Historical   ascorbic acid, vitamin C, (VITAMIN C) 500 mg tablet Take 500 mg by mouth daily. Yes Provider, Historical   VITAMIN B COMPLEX PO Take  by mouth. Takes one po once daily. Yes Provider, Historical   Lactobacillus acidophilus (PROBIOTIC PO) Take  by mouth. Takes one po once daily. Yes Provider, Historical   nabumetone (RELAFEN) 750 mg tablet Take 750 mg by mouth as needed for Pain. Provider, Historical   magnesium oxide (MAG-OX) 400 mg tablet Take 400 mg by mouth daily.     Provider, Historical   alendronate (FOSAMAX) 70 mg tablet TAKE 1 TABLET EVERY SEVEN DAYS 11/15/18   Dolly Lala MD       No Known Allergies      OBJECTIVE    Visit Vitals  /70 (BP 1 Location: Right arm, BP Patient Position: Sitting)   Pulse 65   Temp 97.8 °F (36.6 °C) (Oral)   Resp 17   Ht 6' 1\" (1.854 m)   Wt 170 lb 3.2 oz (77.2 kg)   SpO2 95%   BMI 22.46 kg/m²       Physical Exam  Constitutional:       General: He is not in acute distress. Appearance: He is not diaphoretic. HENT:      Head: Normocephalic. Right Ear: External ear normal.      Left Ear: External ear normal.   Eyes:      Conjunctiva/sclera: Conjunctivae normal.      Pupils: Pupils are equal, round, and reactive to light. Cardiovascular:      Rate and Rhythm: Normal rate and regular rhythm. Heart sounds: Normal heart sounds. No murmur. No friction rub. No gallop. Pulmonary:      Effort: Pulmonary effort is normal. No respiratory distress. Breath sounds: Normal breath sounds. No wheezing or rales. Abdominal:      General: Bowel sounds are normal. There is no distension. Palpations: Abdomen is soft. Tenderness: There is no abdominal tenderness. There is no guarding or rebound. Skin:     General: Skin is warm and dry. Neurological:      Mental Status: He is alert and oriented to person, place, and time. Psychiatric:         Mood and Affect: Affect normal.         Cognition and Memory: Memory normal.         Judgment: Judgment normal.           ASSESSMENT AND PLAN  Ellen Cline is a 77 y.o. male who presents today for:    1. Medicare annual wellness visit, subsequent  See other note. 2. Hyperlipidemia, unspecified hyperlipidemia type  Stable, continue current treatment. - atorvastatin (LIPITOR) 10 mg tablet; TAKE 1 TABLET DAILY  Dispense: 90 Tab; Refill: 3    3. Eczema, unspecified type  - triamcinolone acetonide (KENALOG) 0.1 % ointment; Apply  to affected area two (2) times daily as needed for Skin Irritation or Itching.  use thin layer  Dispense: 30 g; Refill: 0       Medications Discontinued During This Encounter Medication Reason    alendronate (FOSAMAX) 70 mg tablet     magnesium oxide (MAG-OX) 400 mg tablet     nabumetone (RELAFEN) 750 mg tablet     atorvastatin (LIPITOR) 10 mg tablet Reorder    triamcinolone acetonide (KENALOG) 0.1 % ointment Reorder       Follow-up and Dispositions    · Return in about 1 year (around 2/6/2021) for Medicare Wellness Visit (30 min). Medication risks/benefits/costs/interactions/alternatives discussed with patient. Advised patient to call back or return to office if symptoms worsen/change/persist. If patient cannot reach us or should anything more severe/urgent arise he/she should proceed directly to the nearest emergency department. Discussed expected course/resolution/complications of diagnosis in detail with patient. Patient given a written after visit summary which includes his/her diagnoses, current medications and vitals. Patient expressed understanding with the diagnosis and plan. Ke Aguilar M.D.

## 2020-02-06 NOTE — PATIENT INSTRUCTIONS
Medicare Wellness Visit, Male The best way to live healthy is to have a lifestyle where you eat a well-balanced diet, exercise regularly, limit alcohol use, and quit all forms of tobacco/nicotine, if applicable. Regular preventive services are another way to keep healthy. Preventive services (vaccines, screening tests, monitoring & exams) can help personalize your care plan, which helps you manage your own care. Screening tests can find health problems at the earliest stages, when they are easiest to treat. [facility] follows the current, evidence-based guidelines published by the [facility] ([facility]) when recommending preventive services for our patients. Because we follow these guidelines, sometimes recommendations change over time as research supports it. (For example, a prostate screening blood test is no longer routinely recommended for men with no symptoms). Of course, you and your doctor may decide to screen more often for some diseases, based on your risk and co-morbidities (chronic disease you are already diagnosed with). Preventive services for you include: - Medicare offers their members a free annual wellness visit, which is time for you and your primary care provider to discuss and plan for your preventive service needs. Take advantage of this benefit every year! 
-All adults over age 72 should receive the recommended pneumonia vaccines. Current USPSTF guidelines recommend a series of two vaccines for the best pneumonia protection.  
-All adults should have a flu vaccine yearly and tetanus vaccine every 10 years. 
-All adults age 48 and older should receive the shingles vaccines (series of two vaccines).       
-All adults age 38-68 who are overweight should have a diabetes screening test once every three years.  
-Other screening tests & preventive services for persons with diabetes include: an eye exam to screen for diabetic retinopathy, a kidney function test, a foot exam, and stricter control over your cholesterol.  
-Cardiovascular screening for adults with routine risk involves an electrocardiogram (ECG) at intervals determined by the provider.  
-Colorectal cancer screening should be done for adults age 54-65 with no increased risk factors for colorectal cancer. There are a number of acceptable methods of screening for this type of cancer. Each test has its own benefits and drawbacks. Discuss with your provider what is most appropriate for you during your annual wellness visit. The different tests include: colonoscopy (considered the best screening method), a fecal occult blood test, a fecal DNA test, and sigmoidoscopy. 
-All adults born between [facility] should be screened once for Hepatitis C. 
-An Abdominal Aortic Aneurysm (AAA) Screening is recommended for men age 73-68 who has ever smoked in their lifetime. Here is a list of your current Health Maintenance items (your personalized list of preventive services) with a due date: 
Health Maintenance Due Topic Date Due  Glaucoma Screening   07/19/2016 [address] [place] [address] Annual Well Visit  06/09/2019

## 2020-02-06 NOTE — PROGRESS NOTES
Chief Complaint   Patient presents with   [facility] 39 Visit          1. Have you been to the ER, urgent care clinic since your last visit? Hospitalized since your last visit? No    2. Have you seen or consulted any other health care providers outside of the [address] [place] [address] since your last visit? Include any pap smears or colon screening.  No

## 2020-02-06 NOTE — PROGRESS NOTES
This is the Subsequent Medicare Annual Wellness Exam, performed 12 months or more after the Initial AWV or the last Subsequent AWV    I have reviewed the patient's medical history in detail and updated the computerized patient record. History     Patient Active Problem List   Diagnosis Code    HLD (hyperlipidemia) E78.5    Osteopenia M85.80    Spinal stenosis M48.00    Hiatal hernia K44.9    Schatzki's ring K22.2    Neuropathic pain, leg, right M79.2     Past Medical History:   Diagnosis Date    Encounter for screening colonoscopy 09/13/2013    Dr Lea Boas - biopsies performed and determined to be normal - repeat in 5 years    Hiatal hernia     HLD (hyperlipidemia)     pt states he was placed on lipitor d/t plaque on abdominal aorta not elevated cholesterol    Ill-defined condition     plaque was found in abdominal aorta on US    Ill-defined condition     hx malaria in 1970s    Neuropathic pain, leg, right     Osteopenia     DEXA 1/25/17    Schatzki's ring     Spinal stenosis     DJD, stenosis, cyst along L5      Past Surgical History:   Procedure Laterality Date    COLONOSCOPY Left 7/19/2018    COLONOSCOPY performed by Kelby Sparks. Jasbir Murphy MD at [address] HX COLONOSCOPY  09/13/2013    polyps    HX ENDOSCOPY  08/2017    HX HEENT Left 2016    cataract    HX LUMBAR FUSION       Current Outpatient Medications   Medication Sig Dispense Refill    atorvastatin (LIPITOR) 10 mg tablet TAKE 1 TABLET DAILY 90 Tab 3    triamcinolone acetonide (KENALOG) 0.1 % ointment Apply  to affected area two (2) times daily as needed for Skin Irritation or Itching. use thin layer 30 g 0    ASPIRIN PO Take 81 mg by mouth daily.  calcium carbonate/vitamin D3 (CALCIUM + D PO) Take 600 mg by mouth two (2) times a day.  ascorbic acid, vitamin C, (VITAMIN C) 500 mg tablet Take 500 mg by mouth daily.  VITAMIN B COMPLEX PO Take  by mouth. Takes one po once daily.       Lactobacillus acidophilus (PROBIOTIC PO) Take  by mouth. Takes one po once daily. No Known Allergies    Family History   Problem Relation Age of Onset    COPD Father     Stomach Cancer Maternal Grandfather      Social History     Tobacco Use    Smoking status: Former Smoker    Smokeless tobacco: Never Used    Tobacco comment: quit over 30 yrs ago   Substance Use Topics    Alcohol use: Yes     Alcohol/week: 1.0 standard drinks     Types: 1 Shots of liquor per week       Depression Risk Factor Screening:     3 most recent PHQ Screens 2/6/2020   Little interest or pleasure in doing things Not at all   Feeling down, depressed, irritable, or hopeless Not at all   Total Score PHQ 2 0       Alcohol Risk Factor Screening (MALE > 65): Do you average more 1 drink per night or more than 7 drinks a week: No    In the past three months have you have had more than 4 drinks containing alcohol on one occasion: No      Functional Ability and Level of Safety:   Hearing: Hearing is good. Activities of Daily Living: The home contains: no safety equipment. Patient does total self care    Ambulation: with no difficulty    Fall Risk:  Fall Risk Assessment, last 12 mths 2/6/2020   Able to walk? Yes   Fall in past 12 months? No       Abuse Screen:  Patient is not abused    Cognitive Screening   Has your family/caregiver stated any concerns about your memory: no      Patient Care Team   Patient Care Team:  Jay Weeks MD as PCP - General (Family Practice)  Jay Weeks MD as PCP - [facility] Empaneled Provider  Oumar Hyatt MD (Orthopedic Surgery)  Ramírez Kauffman MD as Physician (Gastroenterology)    Assessment/Plan   Education and counseling provided:  Are appropriate based on today's review and evaluation    Diagnoses and all orders for this visit:    1. Medicare annual wellness visit, subsequent    2.  Hyperlipidemia, unspecified hyperlipidemia type  -     atorvastatin (LIPITOR) 10 mg tablet; TAKE 1 TABLET DAILY    3. Eczema, unspecified type  -     triamcinolone acetonide (KENALOG) 0.1 % ointment; Apply  to affected area two (2) times daily as needed for Skin Irritation or Itching.  use thin layer        Health Maintenance Due   Topic Date Due    GLAUCOMA SCREENING Q2Y  07/19/2016

## 2020-02-21 ENCOUNTER — OFFICE VISIT (OUTPATIENT)
Dept: FAMILY MEDICINE CLINIC | Age: 70
End: 2020-02-21

## 2020-02-21 VITALS
RESPIRATION RATE: 18 BRPM | WEIGHT: 171.2 LBS | DIASTOLIC BLOOD PRESSURE: 70 MMHG | HEART RATE: 80 BPM | TEMPERATURE: 97.9 F | SYSTOLIC BLOOD PRESSURE: 130 MMHG | OXYGEN SATURATION: 98 % | BODY MASS INDEX: 22.69 KG/M2 | HEIGHT: 73 IN

## 2020-02-21 DIAGNOSIS — Z01.818 PRE-OP EVALUATION: Primary | ICD-10-CM

## 2020-02-21 NOTE — PROGRESS NOTES
Chief Complaint   Patient presents with    Pre-op Exam     Pre op exam      1. Have you been to the ER, urgent care clinic since your last visit? Hospitalized since your last visit? No    2. Have you seen or consulted any other health care providers outside of the [address] [place] [address] since your last visit? Include any pap smears or colon screening.  No

## 2020-02-21 NOTE — PATIENT INSTRUCTIONS
Cataract Surgery: Before Your Surgery  What is cataract surgery? Cataracts are cloudy areas in the lens of your eye. Your lens is behind the colored part of your eye (iris). Its job is to focus light onto the back of your eye. In some people, cataracts prevent light from reaching the back of the eye. This can cause vision problems. Cataract surgery helps you see better. It replaces your natural lens, which has become cloudy, with a clear artificial one. There are two types of cataract surgery. Phacoemulsification (say \"[id number]-[id number]-[id number]-mbx-GET-shun\") is the most common type. The doctor makes a small cut in your eye. This cut is called an incision. The doctor uses a special ultrasound tool to break your cloudy lens apart. Sometimes a laser is used too. Then he or she removes the small pieces of the lens through the incision. In most cases, the doctor then inserts an artificial lens through the incision. Most people do not need stitches, because the incision is so small. If the doctor is not able to put in an artificial lens, you can wear a contact lens or thick glasses in place of your natural lens. Extracapsular extraction is a less common type of cataract surgery. The doctor makes a larger incision to remove the whole lens at once. After the doctor removes the lens, he or she stitches up the incision. Recovery from this type of surgery takes longer. Before either surgery, the doctor puts numbing drops in your eye. Some doctors use a shot instead. You may also get medicine to make you feel relaxed. You probably will not feel much pain. The surgery takes about 20 to 40 minutes. After surgery, you may have a bandage or shield on your eye. You will probably go home from surgery after 1 hour in the recovery room. Most people see better in 1 to 3 days. You may be able to go back to work or your normal routine in a few days.  It could take 3 to 10 weeks for your eye to completely heal. After your eye heals, you may still need to wear glasses, especially for reading. Follow-up care is a key part of your treatment and safety. Be sure to make and go to all appointments, and call your doctor if you are having problems. It's also a good idea to know your test results and keep a list of the medicines you take. What happens before surgery?   Surgery can be stressful. This information will help you understand what you can expect. And it will help you safely prepare for surgery.   Preparing for surgery    · Understand exactly what surgery is planned, along with the risks, benefits, and other options. · Tell your doctors ALL the medicines, vitamins, supplements, and herbal remedies you take. Some of these can increase the risk of bleeding or interact with anesthesia.     · If you take blood thinners, such as warfarin (Coumadin), clopidogrel (Plavix), or aspirin, be sure to talk to your doctor. He or she will tell you if you should stop taking these medicines before your surgery. Make sure that you understand exactly what your doctor wants you to do.     · Your doctor will tell you which medicines to take or stop before your surgery. You may need to stop taking certain medicines a week or more before surgery. So talk to your doctor as soon as you can.     · If you have an advance directive, let your doctor know. It may include a living will and a durable power of  for health care. Bring a copy to the hospital. If you don't have one, you may want to prepare one. It lets your doctor and loved ones know your health care wishes. Doctors advise that everyone prepare these papers before any type of surgery or procedure. What happens on the day of surgery? · Follow the instructions exactly about when to stop eating and drinking. If you don't, your surgery may be canceled.  If your doctor told you to take your medicines on the day of surgery, take them with only a sip of water.     · Take a bath or shower before you come in for your surgery. Do not apply lotions, perfumes, deodorants, or nail polish.     · Take off all jewelry and piercings. And take out contact lenses, if you wear them.    At the hospital or surgery center   · Bring a picture ID.     · The area for surgery is often marked to make sure there are no errors.     · You will be kept comfortable and safe by your anesthesia provider. The anesthesia may make you sleep. Or it may just numb the area being worked on.     · The surgery will take about 20 to 40 minutes. Going home   · Be sure you have someone to drive you home. Anesthesia and pain medicine make it unsafe for you to drive.     · You will be given more specific instructions about recovering from your surgery. They will cover things like diet, wound care, follow-up care, driving, and getting back to your normal routine.     · You may have a bandage or patch over your eye. You may also have a clear shield over your eye. This prevents you from rubbing it. When should you call your doctor? · You have questions or concerns.     · You don't understand how to prepare for your surgery.     · You become ill before the surgery (such as fever, flu, or a cold).     · You need to reschedule or have changed your mind about having the surgery. Where can you learn more? Go to http://sarah-urszula.info/. Enter K474 in the search box to learn more about \"Cataract Surgery: Before Your Surgery. \"  Current as of: May 5, 2019  Content Version: 12.2  © 4118-5039 [employer]. Care instructions adapted under license by [employer] (which disclaims liability or warranty for this information). If you have questions about a medical condition or this instruction, always ask your healthcare professional. [place] [address] any warranty or liability for your use of this information.

## 2020-02-21 NOTE — PROGRESS NOTES
Patient Name: David Rahman   MRN: [id number]    Yasmin Lynn is a 77 y.o. male who presents with the following:     Pre Op  Procedure: right cataract surgery  Expected Date: 3/19/20  Surgeon: Dr. Lyndsay Jeff  Hx of complications with general anesthesia: none  Signs and symptoms of cardiovascular disease:  none  Have any of the following: CVA, CHF, Cr > 2.0, insulin-dependent DM, ischemic cardiac disease, or suprainguinal vascular/intrathroacic/intraabdominal surgery:  none  Able to meet > 4 METS: yes  STOP-BANG (snoring, tiredness, observed apnea, high BP, BMI>35, age >47, neck circumference> 15 in, male): 3+ risk factors - N/A     Review of Systems   Constitutional: Negative for fever, malaise/fatigue and weight loss. Respiratory: Negative for cough, hemoptysis, shortness of breath and wheezing. Cardiovascular: Negative for chest pain, palpitations, leg swelling and PND. Gastrointestinal: Negative for abdominal pain, constipation, diarrhea, nausea and vomiting. The patient's medications, allergies, past medical history, surgical history, family history and social history were reviewed and updated where appropriate. Prior to Admission medications    Medication Sig Start Date End Date Taking? Authorizing Provider   atorvastatin (LIPITOR) 10 mg tablet TAKE 1 TABLET DAILY 2/6/20  Yes Boone Everett MD   triamcinolone acetonide (KENALOG) 0.1 % ointment Apply  to affected area two (2) times daily as needed for Skin Irritation or Itching. use thin layer 2/6/20  Yes Silviano Olmstead MD   ASPIRIN PO Take 81 mg by mouth daily. Yes Provider, Historical   calcium carbonate/vitamin D3 (CALCIUM + D PO) Take 600 mg by mouth two (2) times a day. Yes Provider, Historical   ascorbic acid, vitamin C, (VITAMIN C) 500 mg tablet Take 500 mg by mouth daily. Yes Provider, Historical   VITAMIN B COMPLEX PO Take  by mouth. Takes one po once daily.    Yes Provider, Historical Lactobacillus acidophilus (PROBIOTIC PO) Take  by mouth. Takes one po once daily. Yes Provider, Historical       No Known Allergies      Past Medical History:   Diagnosis Date    Encounter for screening colonoscopy 09/13/2013    Dr Antonio Waller - biopsies performed and determined to be normal - repeat in 5 years    Hiatal hernia     HLD (hyperlipidemia)     pt states he was placed on lipitor d/t plaque on abdominal aorta not elevated cholesterol    Ill-defined condition     hx malaria in 1970s    Neuropathic pain, leg, right     Osteopenia     DEXA 1/25/17    Schatzki's ring     Spinal stenosis     DJD, stenosis, cyst along L5       Past Surgical History:   Procedure Laterality Date    COLONOSCOPY Left 7/19/2018    COLONOSCOPY performed by Isac Vincent. Maria Fernanda Stoll MD [facility]COPY    HX CATARACT REMOVAL Left     HX COLONOSCOPY  09/13/2013    polyps    HX ENDOSCOPY  08/2017    HX LUMBAR FUSION         Family History   Problem Relation Age of Onset    COPD Father     Stomach Cancer Maternal Grandfather        Social History     Socioeconomic History    Marital status:      Spouse name: Not on file    Number of children: Not on file    Years of education: Not on file    Highest education level: Not on file   Occupational History    Not on file   Social Needs    Financial resource strain: Not on file    Food insecurity:     Worry: Not on file     Inability: Not on file    Transportation needs:     Medical: Not on file     Non-medical: Not on file   Tobacco Use    Smoking status: Former Smoker    Smokeless tobacco: Never Used    Tobacco comment: quit over 30 yrs ago   Substance and Sexual Activity    Alcohol use:  Yes     Alcohol/week: 1.0 standard drinks     Types: 1 Shots of liquor per week    Drug use: No    Sexual activity: Never   Lifestyle    Physical activity:     Days per week: Not on file     Minutes per session: Not on file    Stress: Not on file   Relationships    Social connections:     Talks on phone: Not on file     Gets together: Not on file     Attends Denominational service: Not on file     Active member of club or organization: Not on file     Attends meetings of clubs or organizations: Not on file     Relationship status: Not on file    Intimate partner violence:     Fear of current or ex partner: Not on file     Emotionally abused: Not on file     Physically abused: Not on file     Forced sexual activity: Not on file   Other Topics Concern    Not on file   Social History Narrative    Not on file         OBJECTIVE    Visit Vitals  /70 (BP 1 Location: Left arm, BP Patient Position: Sitting)   Pulse 80   Temp 97.9 °F (36.6 °C) (Oral)   Resp 18   Ht 6' 1\" (1.854 m)   Wt 171 lb 3.2 oz (77.7 kg)   SpO2 98%   BMI 22.59 kg/m²       Physical Exam  Constitutional:       General: He is not in acute distress. Appearance: He is not diaphoretic. HENT:      Head: Normocephalic. Right Ear: External ear normal.      Left Ear: External ear normal.   Eyes:      Conjunctiva/sclera: Conjunctivae normal.      Pupils: Pupils are equal, round, and reactive to light. Cardiovascular:      Rate and Rhythm: Normal rate and regular rhythm. Pulses:           Carotid pulses are 2+ on the right side and 2+ on the left side. Heart sounds: Normal heart sounds. No murmur. No friction rub. No gallop. Pulmonary:      Effort: Pulmonary effort is normal. No respiratory distress. Breath sounds: Normal breath sounds. No wheezing or rales. Abdominal:      General: Bowel sounds are normal. There is no distension. Palpations: Abdomen is soft. Tenderness: There is no abdominal tenderness. There is no guarding or rebound. Skin:     General: Skin is warm and dry. Neurological:      Mental Status: He is alert and oriented to person, place, and time.    Psychiatric:         Mood and Affect: Affect normal.         Cognition and Memory: Memory normal.         Judgment: Judgment normal.           ASSESSMENT AND PLAN  Elvira Villalobos is a 77 y.o. male who presents today for:    1. Pre-op evaluation  Risk of cardiac death and nonfatal myocardial infarction for noncardiac surgical procedures:  <1% due to cataract surgery. Revised Cardiac Risk Index of a major cardiac event is 0.4%. Patient has no clinical predictor of perioperative cardiac complications. These risk calculators have been reviewed with the patient who expressed understanding of the relative cardiac risk of his/her upcoming procedure given his/her current risk factors. According to the [facility] and AHA Guidelines of perioperative cardiovascular evaluation for noncardiac surgery, patient is cleared for right cataract surgery (no clinical predictors, low risk procedure, > 4 METS). There are no discontinued medications. Medication risks/benefits/costs/interactions/alternatives discussed with patient. Advised patient to call back or return to office if symptoms worsen/change/persist. If patient cannot reach us or should anything more severe/urgent arise he/she should proceed directly to the nearest emergency department. Discussed expected course/resolution/complications of diagnosis in detail with patient. Patient given a written after visit summary which includes his/her diagnoses, current medications and vitals. Patient expressed understanding with the diagnosis and plan. Ke Silver M.D.

## 2021-02-21 ENCOUNTER — PATIENT MESSAGE (OUTPATIENT)
Dept: FAMILY MEDICINE CLINIC | Age: 71
End: 2021-02-21

## 2021-02-21 DIAGNOSIS — M85.80 OSTEOPENIA, UNSPECIFIED LOCATION: ICD-10-CM

## 2021-02-21 DIAGNOSIS — E78.5 HYPERLIPIDEMIA, UNSPECIFIED HYPERLIPIDEMIA TYPE: ICD-10-CM

## 2021-02-21 DIAGNOSIS — E78.5 HYPERLIPIDEMIA, UNSPECIFIED HYPERLIPIDEMIA TYPE: Primary | ICD-10-CM

## 2021-02-21 RX ORDER — ATORVASTATIN CALCIUM 10 MG/1
TABLET, FILM COATED ORAL
Qty: 90 TAB | Refills: 3 | Status: CANCELLED | OUTPATIENT
Start: 2021-02-21

## 2021-02-22 NOTE — TELEPHONE ENCOUNTER
Duplicate request - Lipitor 10 mg #90 with 3 refills was sent to [facility] on 02/06/2020.      E-Prescribing Status: Receipt confirmed by pharmacy (2/6/2020  3:15 PM     Requested Prescriptions     Pending Prescriptions Disp Refills    atorvastatin (LIPITOR) 10 mg tablet 90 Tab 3     Sig: TAKE 1 TABLET DAILY

## 2021-02-26 NOTE — TELEPHONE ENCOUNTER
From: Monika Gaffney  To: Lianne Nunes MD  Sent: 2/21/2021 9:19 AM EST  Subject: Non-Urgent Richie Angulo Patient,  I have an appointment with you on 3/22. Please order my fasting labs so I can have them completed with results that can be reviewed at my visit. I will probably stop by for labs in two weeks.   Thank you,  Alyx Albarado

## 2021-03-10 ENCOUNTER — LAB ONLY (OUTPATIENT)
Dept: FAMILY MEDICINE CLINIC | Age: 71
End: 2021-03-10

## 2021-03-10 DIAGNOSIS — M85.80 OSTEOPENIA, UNSPECIFIED LOCATION: ICD-10-CM

## 2021-03-10 DIAGNOSIS — E78.5 HYPERLIPIDEMIA, UNSPECIFIED HYPERLIPIDEMIA TYPE: ICD-10-CM

## 2021-03-10 LAB
25(OH)D3 SERPL-MCNC: 51 NG/ML (ref 30–100)
ALBUMIN SERPL-MCNC: 3.8 G/DL (ref 3.5–5)
ALBUMIN/GLOB SERPL: 1.3 {RATIO} (ref 1.1–2.2)
ALP SERPL-CCNC: 56 U/L (ref 45–117)
ALT SERPL-CCNC: 35 U/L (ref 12–78)
ANION GAP SERPL CALC-SCNC: 6 MMOL/L (ref 5–15)
AST SERPL-CCNC: 30 U/L (ref 15–37)
BILIRUB SERPL-MCNC: 0.7 MG/DL (ref 0.2–1)
BUN SERPL-MCNC: 14 MG/DL (ref 6–20)
BUN/CREAT SERPL: 13 (ref 12–20)
CALCIUM SERPL-MCNC: 9.6 MG/DL (ref 8.5–10.1)
CHLORIDE SERPL-SCNC: 108 MMOL/L (ref 97–108)
CHOLEST SERPL-MCNC: 146 MG/DL
CO2 SERPL-SCNC: 27 MMOL/L (ref 21–32)
CREAT SERPL-MCNC: 1.09 MG/DL (ref 0.7–1.3)
ERYTHROCYTE [DISTWIDTH] IN BLOOD BY AUTOMATED COUNT: 12.9 % (ref 11.5–14.5)
GLOBULIN SER CALC-MCNC: 2.9 G/DL (ref 2–4)
GLUCOSE SERPL-MCNC: 94 MG/DL (ref 65–100)
HCT VFR BLD AUTO: 43.3 % (ref 36.6–50.3)
HDLC SERPL-MCNC: 108 MG/DL
HDLC SERPL: 1.4 {RATIO} (ref 0–5)
HGB BLD-MCNC: 14.9 G/DL (ref 12.1–17)
LDLC SERPL CALC-MCNC: 29.6 MG/DL (ref 0–100)
LIPID PROFILE,FLP: NORMAL
MCH RBC QN AUTO: 33.1 PG (ref 26–34)
MCHC RBC AUTO-ENTMCNC: 34.4 G/DL (ref 30–36.5)
MCV RBC AUTO: 96.2 FL (ref 80–99)
NRBC # BLD: 0 K/UL (ref 0–0.01)
NRBC BLD-RTO: 0 PER 100 WBC
PLATELET # BLD AUTO: 202 K/UL (ref 150–400)
PMV BLD AUTO: 9.5 FL (ref 8.9–12.9)
POTASSIUM SERPL-SCNC: 4.6 MMOL/L (ref 3.5–5.1)
PROT SERPL-MCNC: 6.7 G/DL (ref 6.4–8.2)
RBC # BLD AUTO: 4.5 M/UL (ref 4.1–5.7)
SODIUM SERPL-SCNC: 141 MMOL/L (ref 136–145)
TRIGL SERPL-MCNC: 42 MG/DL (ref ?–150)
VLDLC SERPL CALC-MCNC: 8.4 MG/DL
WBC # BLD AUTO: 6.1 K/UL (ref 4.1–11.1)

## 2021-03-22 ENCOUNTER — OFFICE VISIT (OUTPATIENT)
Dept: FAMILY MEDICINE CLINIC | Age: 71
End: 2021-03-22
Payer: MEDICARE

## 2021-03-22 VITALS
SYSTOLIC BLOOD PRESSURE: 134 MMHG | WEIGHT: 181 LBS | OXYGEN SATURATION: 97 % | BODY MASS INDEX: 23.99 KG/M2 | RESPIRATION RATE: 18 BRPM | DIASTOLIC BLOOD PRESSURE: 78 MMHG | TEMPERATURE: 97.6 F | HEART RATE: 80 BPM | HEIGHT: 73 IN

## 2021-03-22 DIAGNOSIS — Z00.00 ENCOUNTER FOR MEDICARE ANNUAL WELLNESS EXAM: Primary | ICD-10-CM

## 2021-03-22 DIAGNOSIS — E78.5 HYPERLIPIDEMIA, UNSPECIFIED HYPERLIPIDEMIA TYPE: ICD-10-CM

## 2021-03-22 PROCEDURE — G8510 SCR DEP NEG, NO PLAN REQD: HCPCS | Performed by: FAMILY MEDICINE

## 2021-03-22 PROCEDURE — G8420 CALC BMI NORM PARAMETERS: HCPCS | Performed by: FAMILY MEDICINE

## 2021-03-22 PROCEDURE — G8427 DOCREV CUR MEDS BY ELIG CLIN: HCPCS | Performed by: FAMILY MEDICINE

## 2021-03-22 PROCEDURE — 1101F PT FALLS ASSESS-DOCD LE1/YR: CPT | Performed by: FAMILY MEDICINE

## 2021-03-22 PROCEDURE — G0439 PPPS, SUBSEQ VISIT: HCPCS | Performed by: FAMILY MEDICINE

## 2021-03-22 PROCEDURE — 3017F COLORECTAL CA SCREEN DOC REV: CPT | Performed by: FAMILY MEDICINE

## 2021-03-22 PROCEDURE — G8536 NO DOC ELDER MAL SCRN: HCPCS | Performed by: FAMILY MEDICINE

## 2021-03-22 RX ORDER — ATORVASTATIN CALCIUM 10 MG/1
5 TABLET, FILM COATED ORAL DAILY
Qty: 45 TAB | Refills: 3 | Status: SHIPPED | OUTPATIENT
Start: 2021-03-22 | End: 2022-02-21

## 2021-03-22 NOTE — PROGRESS NOTES
Patient Name: Walker Kelley   MRN: [id number]    Denece Ma is a 72 y.o. male who presents with the following:     Labs notable for low LDL at 26. Has been on atorvastatin 10 mg for prevention from prior PCP. No fhx of early CAD. Otherwise doing well. Review of Systems   Constitutional: Negative for fever, malaise/fatigue and weight loss. Respiratory: Negative for cough, hemoptysis, shortness of breath and wheezing. Cardiovascular: Negative for chest pain, palpitations, leg swelling and PND. Gastrointestinal: Negative for abdominal pain, constipation, diarrhea, nausea and vomiting. The patient's medications, allergies, past medical history, surgical history, family history and social history were reviewed and updated where appropriate. Prior to Admission medications    Medication Sig Start Date End Date Taking? Authorizing Provider   atorvastatin (LIPITOR) 10 mg tablet TAKE 1 TABLET DAILY 2/22/21  Yes Naun Dash MD   triamcinolone acetonide (KENALOG) 0.1 % ointment Apply  to affected area two (2) times daily as needed for Skin Irritation or Itching. use thin layer 2/6/20  Yes Sherif Olmstead MD   ASPIRIN PO Take 81 mg by mouth daily. Yes Provider, Historical   calcium carbonate/vitamin D3 (CALCIUM + D PO) Take 600 mg by mouth two (2) times a day. Yes Provider, Historical   ascorbic acid, vitamin C, (VITAMIN C) 500 mg tablet Take 500 mg by mouth daily. Yes Provider, Historical   VITAMIN B COMPLEX PO Take  by mouth. Takes one po once daily. Yes Provider, Historical   Lactobacillus acidophilus (PROBIOTIC PO) Take  by mouth. Takes one po once daily.    Yes Provider, Historical       No Known Allergies      OBJECTIVE    Visit Vitals  /78 (BP 1 Location: Left upper arm, BP Patient Position: Sitting, BP Cuff Size: Adult)   Pulse 80   Temp 97.6 °F (36.4 °C) (Temporal)   Resp 18   Ht 6' 1\" (1.854 m)   Wt 181 lb (82.1 kg)   SpO2 97%   BMI 23.88 kg/m² Physical Exam  Constitutional:       General: He is not in acute distress. Appearance: He is not diaphoretic. HENT:      Head: Normocephalic. Right Ear: External ear normal.      Left Ear: External ear normal.   Eyes:      Conjunctiva/sclera: Conjunctivae normal.      Pupils: Pupils are equal, round, and reactive to light. Cardiovascular:      Rate and Rhythm: Normal rate and regular rhythm. Heart sounds: Normal heart sounds. No murmur. No friction rub. No gallop. Pulmonary:      Effort: Pulmonary effort is normal. No respiratory distress. Breath sounds: Normal breath sounds. No wheezing or rales. Abdominal:      General: Bowel sounds are normal. There is no distension. Palpations: Abdomen is soft. Tenderness: There is no abdominal tenderness. There is no guarding or rebound. Skin:     General: Skin is warm and dry. Neurological:      Mental Status: He is alert and oriented to person, place, and time. Psychiatric:         Mood and Affect: Affect normal.         Cognition and Memory: Memory normal.         Judgment: Judgment normal.           ASSESSMENT AND PLAN  Gorge Urena is a 72 y.o. male who presents today for:    1. Encounter for Medicare annual wellness exam    2. Hyperlipidemia, unspecified hyperlipidemia type  Reduce statin to 5 mg daily or take every other day. - atorvastatin (LIPITOR) 10 mg tablet; Take 0.5 Tabs by mouth daily. Dispense: 45 Tab; Refill: 3       Medications Discontinued During This Encounter   Medication Reason    atorvastatin (LIPITOR) 10 mg tablet REORDER       Follow-up and Dispositions    · Return in about 1 year (around 3/22/2022) for Medicare Wellness Visit (30 min). Treatment risks/benefits/costs/interactions/alternatives discussed with patient.   Advised patient to call back or return to office if symptoms worsen/change/persist. If patient cannot reach us or should anything more severe/urgent arise he/she should proceed directly to the nearest emergency department. Discussed expected course/resolution/complications of diagnosis in detail with patient. Patient expressed understanding with the diagnosis and plan. Ke Gale M.D.

## 2021-03-22 NOTE — PROGRESS NOTES
This is the Subsequent Medicare Annual Wellness Exam, performed 12 months or more after the Initial AWV or the last Subsequent AWV    I have reviewed the patient's medical history in detail and updated the computerized patient record. Depression Risk Factor Screening:     3 most recent PHQ Screens 3/22/2021   Little interest or pleasure in doing things Not at all   Feeling down, depressed, irritable, or hopeless Not at all   Total Score PHQ 2 0       Alcohol Risk Screen    Do you average more than 1 drink per night or more than 7 drinks a week: No    In the past three months have you have had more than 4 drinks containing alcohol on one occasion: No        Functional Ability and Level of Safety:    Hearing: Hearing is good. Activities of Daily Living: The home contains: no safety equipment. ADL Assessment 3/22/2021   Feeding yourself No Help Needed   Getting from bed to chair No Help Needed   Getting dressed No Help Needed   Bathing or showering No Help Needed   Walk across the room (includes cane/walker) No Help Needed   Using the telphone No Help Needed   Taking your medications No Help Needed   Preparing meals No Help Needed   Managing money (expenses/bills) No Help Needed   Moderately strenuous housework (laundry) No Help Needed   Shopping for personal items (toiletries/medicines) No Help Needed   Shopping for groceries No Help Needed   Driving No Help Needed   Climbing a flight of stairs No Help Needed   Getting to places beyond walking distances No Help Needed         Ambulation: with no difficulty     Fall Risk:  Fall Risk Assessment, last 12 mths 3/22/2021   Able to walk? Yes   Fall in past 12 months? 0   Do you feel unsteady?  0   Are you worried about falling 0      Abuse Screen:  Patient is not abused       Cognitive Screening    Has your family/caregiver stated any concerns about your memory: no       Assessment/Plan   Education and counseling provided:  Are appropriate based on today's review and evaluation    Diagnoses and all orders for this visit:    1. Encounter for Medicare annual wellness exam    2. Hyperlipidemia, unspecified hyperlipidemia type  -     atorvastatin (LIPITOR) 10 mg tablet; Take 0.5 Tabs by mouth daily. Health Maintenance Due     Health Maintenance Due   Topic Date Due    COVID-19 Vaccine (1) Never done    Flu Vaccine (1) 09/01/2020       Patient Care Team   Patient Care Team:  Chino Daniel MD as PCP - General (Family Medicine)  Chino Daniel [facility]  Nathaly Strickland MD (Orthopedic Surgery)  Ondina Hernadez MD as Physician (Gastroenterology)    History     Patient Active Problem List   Diagnosis Code    HLD (hyperlipidemia) E78.5    Osteopenia M85.80    Spinal stenosis M48.00    Hiatal hernia K44.9    Schatzki's ring K22.2    Neuropathic pain, leg, right M79.2     Past Medical History:   Diagnosis Date    Encounter for screening colonoscopy 09/13/2013    Dr Felicity Christianson - biopsies performed and determined to be normal - repeat in 5 years    Hiatal hernia     HLD (hyperlipidemia)     pt states he was placed on lipitor d/t plaque on abdominal aorta not elevated cholesterol    Ill-defined condition     hx malaria in 1970s    Neuropathic pain, leg, right     Osteopenia     DEXA 1/25/17    Schatzki's ring     Spinal stenosis     DJD, stenosis, cyst along L5      Past Surgical History:   Procedure Laterality Date    COLONOSCOPY Left 7/19/2018    COLONOSCOPY performed by Zaki Whittington. [facility]COPY    HX CATARACT REMOVAL Left     HX COLONOSCOPY  09/13/2013    polyps    HX ENDOSCOPY  08/2017    HX LUMBAR FUSION       Current Outpatient Medications   Medication Sig Dispense Refill    atorvastatin (LIPITOR) 10 mg tablet Take 0.5 Tabs by mouth daily. 45 Tab 3    triamcinolone acetonide (KENALOG) 0.1 % ointment Apply  to affected area two (2) times daily as needed for Skin Irritation or Itching.  use thin layer 30 g 0    ASPIRIN PO Take 81 mg by mouth daily.  calcium carbonate/vitamin D3 (CALCIUM + D PO) Take 600 mg by mouth two (2) times a day.  ascorbic acid, vitamin C, (VITAMIN C) 500 mg tablet Take 500 mg by mouth daily.  VITAMIN B COMPLEX PO Take  by mouth. Takes one po once daily.  Lactobacillus acidophilus (PROBIOTIC PO) Take  by mouth. Takes one po once daily. No Known Allergies    Family History   Problem Relation Age of Onset    COPD Father     Stomach Cancer Maternal Grandfather      Social History     Tobacco Use    Smoking status: Former Smoker    Smokeless tobacco: Never Used    Tobacco comment: quit over 30 yrs ago   Substance Use Topics    Alcohol use:  Yes     Alcohol/week: 1.0 standard drinks     Types: 1 Shots of liquor per week

## 2021-03-22 NOTE — PROGRESS NOTES
Chief Complaint   Patient presents with   [facility] Annual Wellness Visit     1. Have you been to the ER, urgent care clinic since your last visit? Hospitalized since your last visit? No    2. Have you seen or consulted any other health care providers outside of the [address] [place] [address] since your last visit? Include any pap smears or colon screening.  No

## 2021-08-15 ENCOUNTER — PATIENT MESSAGE (OUTPATIENT)
Dept: FAMILY MEDICINE CLINIC | Age: 71
End: 2021-08-15

## 2021-08-15 DIAGNOSIS — K22.2 SCHATZKI'S RING: Primary | ICD-10-CM

## 2021-08-22 ENCOUNTER — PATIENT MESSAGE (OUTPATIENT)
Dept: FAMILY MEDICINE CLINIC | Age: 71
End: 2021-08-22

## 2021-08-22 DIAGNOSIS — D22.9 ATYPICAL MOLE: Primary | ICD-10-CM

## 2021-08-23 NOTE — TELEPHONE ENCOUNTER
Tony Sanchez, [place] 4/29/1495 4:98 AM EDT    Please Eval  ----- Message -----  From: Elizabeth Gaffney  Sent: 8/22/2021 12:06 PM EDT  To: [facility] Nurse Pool  Subject: Non-Urgent Medical Question     Harish again, Dr. Ruthie Lee. I recently notices a dark spot on my arm. It is very small and looks like a shante from a sharpie. Do you think I should have this checked out? I did a quick scan of my body and did not notice anything unusual. I am attaching a couple of photos. The close-up one is not very good quality but you can see from the photo of my arm the size of the spot. If you want me to see a dermatologist you will need to make a referral. The physicians who are in-network with my insurance are [facility] on [address]. Let me know what you think.   Thanks

## 2021-11-04 RX ORDER — ATORVASTATIN CALCIUM 10 MG/1
10 TABLET, FILM COATED ORAL EVERY OTHER DAY
COMMUNITY
End: 2022-05-31 | Stop reason: SDUPTHER

## 2021-11-04 RX ORDER — VITAMIN E 1000 UNIT
1000 CAPSULE ORAL DAILY
COMMUNITY

## 2021-11-08 ENCOUNTER — HOSPITAL ENCOUNTER (OUTPATIENT)
Dept: PREADMISSION TESTING | Age: 71
Discharge: HOME | End: 2021-11-08
Payer: MEDICARE

## 2021-11-08 ENCOUNTER — TRANSCRIBE ORDER (OUTPATIENT)
Dept: REGISTRATION | Age: 71
End: 2021-11-08

## 2021-11-08 DIAGNOSIS — Z01.812 PRE-PROCEDURE LAB EXAM: Primary | ICD-10-CM

## 2021-11-08 DIAGNOSIS — Z01.812 PRE-PROCEDURE LAB EXAM: ICD-10-CM

## 2021-11-08 PROCEDURE — U0005 INFEC AGEN DETEC AMPLI PROBE: HCPCS

## 2021-11-09 LAB
SARS-COV-2, XPLCVT: NOT DETECTED
SOURCE, COVRS: NORMAL

## 2021-11-11 ENCOUNTER — ANESTHESIA (OUTPATIENT)
Dept: ENDOSCOPY | Age: 71
End: 2021-11-11
Payer: MEDICARE

## 2021-11-11 ENCOUNTER — HOSPITAL ENCOUNTER (OUTPATIENT)
Age: 71
Setting detail: OUTPATIENT SURGERY
Discharge: HOME | End: 2021-11-11
Attending: INTERNAL MEDICINE | Admitting: INTERNAL MEDICINE
Payer: MEDICARE

## 2021-11-11 ENCOUNTER — ANESTHESIA EVENT (OUTPATIENT)
Dept: ENDOSCOPY | Age: 71
End: 2021-11-11
Payer: MEDICARE

## 2021-11-11 VITALS
BODY MASS INDEX: 22.53 KG/M2 | TEMPERATURE: 97.7 F | WEIGHT: 170 LBS | RESPIRATION RATE: 15 BRPM | SYSTOLIC BLOOD PRESSURE: 117 MMHG | HEIGHT: 73 IN | DIASTOLIC BLOOD PRESSURE: 59 MMHG | HEART RATE: 71 BPM | OXYGEN SATURATION: 95 %

## 2021-11-11 PROCEDURE — 76060000031 HC ANESTHESIA FIRST 0.5 HR: Performed by: INTERNAL MEDICINE

## 2021-11-11 PROCEDURE — C1726 CATH, BAL DIL, NON-VASCULAR: HCPCS | Performed by: INTERNAL MEDICINE

## 2021-11-11 PROCEDURE — 76040000019: Performed by: INTERNAL MEDICINE

## 2021-11-11 PROCEDURE — 2709999900 HC NON-CHARGEABLE SUPPLY: Performed by: INTERNAL MEDICINE

## 2021-11-11 PROCEDURE — 74011250636 HC RX REV CODE- 250/636: Performed by: NURSE ANESTHETIST, CERTIFIED REGISTERED

## 2021-11-11 PROCEDURE — 77030018712 HC DEV BLLN INFL BSC -B: Performed by: INTERNAL MEDICINE

## 2021-11-11 PROCEDURE — 74011000250 HC RX REV CODE- 250: Performed by: NURSE ANESTHETIST, CERTIFIED REGISTERED

## 2021-11-11 RX ORDER — SODIUM CHLORIDE 0.9 % (FLUSH) 0.9 %
5-40 SYRINGE (ML) INJECTION EVERY 8 HOURS
Status: DISCONTINUED | OUTPATIENT
Start: 2021-11-11 | End: 2021-11-11 | Stop reason: HOSPADM

## 2021-11-11 RX ORDER — SODIUM CHLORIDE 9 MG/ML
INJECTION, SOLUTION INTRAVENOUS
Status: DISCONTINUED | OUTPATIENT
Start: 2021-11-11 | End: 2021-11-11 | Stop reason: HOSPADM

## 2021-11-11 RX ORDER — SODIUM CHLORIDE 0.9 % (FLUSH) 0.9 %
5-40 SYRINGE (ML) INJECTION AS NEEDED
Status: DISCONTINUED | OUTPATIENT
Start: 2021-11-11 | End: 2021-11-11 | Stop reason: HOSPADM

## 2021-11-11 RX ORDER — PROPOFOL 10 MG/ML
INJECTION, EMULSION INTRAVENOUS AS NEEDED
Status: DISCONTINUED | OUTPATIENT
Start: 2021-11-11 | End: 2021-11-11 | Stop reason: HOSPADM

## 2021-11-11 RX ORDER — LIDOCAINE HYDROCHLORIDE 20 MG/ML
INJECTION, SOLUTION EPIDURAL; INFILTRATION; INTRACAUDAL; PERINEURAL AS NEEDED
Status: DISCONTINUED | OUTPATIENT
Start: 2021-11-11 | End: 2021-11-11 | Stop reason: HOSPADM

## 2021-11-11 RX ORDER — SODIUM CHLORIDE 9 MG/ML
50 INJECTION, SOLUTION INTRAVENOUS CONTINUOUS
Status: DISCONTINUED | OUTPATIENT
Start: 2021-11-11 | End: 2021-11-11 | Stop reason: HOSPADM

## 2021-11-11 RX ORDER — EPINEPHRINE 0.1 MG/ML
1 INJECTION INTRACARDIAC; INTRAVENOUS
Status: DISCONTINUED | OUTPATIENT
Start: 2021-11-11 | End: 2021-11-11 | Stop reason: HOSPADM

## 2021-11-11 RX ORDER — DEXTROMETHORPHAN/PSEUDOEPHED 2.5-7.5/.8
1.2 DROPS ORAL
Status: DISCONTINUED | OUTPATIENT
Start: 2021-11-11 | End: 2021-11-11 | Stop reason: HOSPADM

## 2021-11-11 RX ORDER — MIDAZOLAM HYDROCHLORIDE 1 MG/ML
.25-5 INJECTION, SOLUTION INTRAMUSCULAR; INTRAVENOUS
Status: DISCONTINUED | OUTPATIENT
Start: 2021-11-11 | End: 2021-11-11 | Stop reason: HOSPADM

## 2021-11-11 RX ORDER — ATROPINE SULFATE 0.1 MG/ML
0.5 INJECTION INTRAVENOUS
Status: DISCONTINUED | OUTPATIENT
Start: 2021-11-11 | End: 2021-11-11 | Stop reason: HOSPADM

## 2021-11-11 RX ORDER — NALOXONE HYDROCHLORIDE 0.4 MG/ML
0.4 INJECTION, SOLUTION INTRAMUSCULAR; INTRAVENOUS; SUBCUTANEOUS
Status: DISCONTINUED | OUTPATIENT
Start: 2021-11-11 | End: 2021-11-11 | Stop reason: HOSPADM

## 2021-11-11 RX ORDER — FLUMAZENIL 0.1 MG/ML
0.2 INJECTION INTRAVENOUS
Status: DISCONTINUED | OUTPATIENT
Start: 2021-11-11 | End: 2021-11-11 | Stop reason: HOSPADM

## 2021-11-11 RX ORDER — FENTANYL CITRATE 50 UG/ML
25-200 INJECTION, SOLUTION INTRAMUSCULAR; INTRAVENOUS
Status: DISCONTINUED | OUTPATIENT
Start: 2021-11-11 | End: 2021-11-11 | Stop reason: HOSPADM

## 2021-11-11 RX ORDER — PANTOPRAZOLE SODIUM 20 MG/1
20 TABLET, DELAYED RELEASE ORAL DAILY
Qty: 30 TABLET | Refills: 1 | Status: SHIPPED | OUTPATIENT
Start: 2021-11-11 | End: 2022-01-10

## 2021-11-11 RX ADMIN — PROPOFOL 100 MG: 10 INJECTION, EMULSION INTRAVENOUS at 11:11

## 2021-11-11 RX ADMIN — PROPOFOL 40 MG: 10 INJECTION, EMULSION INTRAVENOUS at 11:14

## 2021-11-11 RX ADMIN — SODIUM CHLORIDE: 900 INJECTION, SOLUTION INTRAVENOUS at 11:10

## 2021-11-11 RX ADMIN — LIDOCAINE HYDROCHLORIDE 80 MG: 20 INJECTION, SOLUTION EPIDURAL; INFILTRATION; INTRACAUDAL; PERINEURAL at 11:11

## 2021-11-11 RX ADMIN — PROPOFOL 40 MG: 10 INJECTION, EMULSION INTRAVENOUS at 11:16

## 2021-11-11 RX ADMIN — PROPOFOL 30 MG: 10 INJECTION, EMULSION INTRAVENOUS at 11:18

## 2021-11-11 NOTE — ANESTHESIA PREPROCEDURE EVALUATION
Anesthetic History   No history of anesthetic complications            Review of Systems / Medical History  Patient summary reviewed, nursing notes reviewed and pertinent labs reviewed    Pulmonary  Within defined limits                 Neuro/Psych   Within defined limits           Cardiovascular  Within defined limits                     GI/Hepatic/Renal  Within defined limits              Endo/Other  Within defined limits           Other Findings              Physical Exam    Airway  Mallampati: II  TM Distance: > 6 cm  Neck ROM: normal range of motion   Mouth opening: Normal     Cardiovascular  Regular rate and rhythm,  S1 and S2 normal,  no murmur, click, rub, or gallop             Dental    Dentition: Caps/crowns     Pulmonary  Breath sounds clear to auscultation               Abdominal  GI exam deferred       Other Findings            Anesthetic Plan    ASA: 2  Anesthesia type: MAC          Induction: Intravenous  Anesthetic plan and risks discussed with: Patient

## 2021-11-11 NOTE — PROGRESS NOTES
Dianne Martinez [facility]  1950  [id number]    Situation:  Verbal report received from: Alvino Phelps RN  Procedure: Procedure(s):  ESOPHAGOGASTRODUODENOSCOPY (EGD)   :-  ESOPHAGEAL DILATION    Background:    Preoperative diagnosis: DYSPHAGIA/LOWER ESOPHAGEAL RING  Postoperative diagnosis: 1)Hiatal Hernia  2)Schatzki Ring    :  Dr. Joaquin England  Assistant(s): Endoscopy Technician-1: Rosario Casiano  Endoscopy RN-1: Amilcar Hardin RN    Specimens: * No specimens in log *  H. Pylori  no    Anesthesia gave intra-procedure sedation and medications, see anesthesia flow sheet yes    Intravenous fluids: NS@ KVO     Vital signs stable Yes    Abdominal assessment: round and soft Yes    Recommendation:  Discharge patient per MD order.   Family wife is in waiting room  Permission to share finding with family or friend yes

## 2021-11-11 NOTE — DISCHARGE INSTRUCTIONS
[address]  [address] [facility]    EGD DISCHARGE INSTRUCTIONS    Gorge Urena  [id number]  1950    Discomfort:  Sore throat- throat lozenges or warm salt water gargle  redness at IV site- apply warm compress to area; if redness or soreness persist- contact your physician  Gaseous discomfort- walking, belching will help relieve any discomfort  You may not operate a vehicle for 12 hours  You may not engage in an occupation involving machinery or appliances for rest of today  You may not drink alcoholic beverages for at least 12 hours  Avoid making any critical decisions for at least 24 hour  DIET  You may resume your regular diet - however -  remember your colon is empty and a heavy meal will produce gas. Avoid these foods:  vegetables, fried / greasy foods, carbonated drinks    ACTIVITY  You may resume your normal daily activities   Spend the remainder of the day resting -  avoid any strenuous activity. CALL M.D. ANY SIGN OF   Increasing pain, nausea, vomiting  Abdominal distension (swelling)  New increased bleeding (oral or rectal)  Fever (chills)  Pain in chest area  Bloody discharge from nose or mouth  Shortness of breath    Follow-up Instructions:   Call Dr. Liliana Mendoza for any questions or problems. Telephone # [phone]    ENDOSCOPY FINDINGS:   Your endoscopy showed a possible Zenker diverticulum. This could explain any choking spells that occur while eating. My office will arrange for you to have a radiology test called a modified barium swallow to further evaluate this. A Schatzki ring was identified today and this was stretched with a balloon. Additionally, a hiatal hernia was seen today. I have sent a prescription for pantoprazole 40 mg daily. Please take this for two months. Follow up will be determined following completion of your radiology test.    Signed By: Edouard Damian.  Vaughn Guerra MD     11/11/2021  11:27 AM         Learning About Fabiola Olvera (COVID-19)  Coronavirus (COVID-19): Overview  What is coronavirus (FVVAO-27)? The coronavirus disease (COVID-19) is caused by a virus. It is an illness that was first found in [place], [place], in December 2019. It has since spread worldwide. The virus can cause fever, cough, and trouble breathing. In severe cases, it can cause pneumonia and make it hard to breathe without help. It can cause death. Coronaviruses are a large group of viruses. They cause the common cold. They also cause more serious illnesses like Middle East respiratory syndrome (MERS) and severe acute respiratory syndrome (SARS). COVID-19 is caused by a novel coronavirus. That means it's a new type that has not been seen in people before. This virus spreads person-to-person through droplets from coughing and sneezing. It can also spread when you are close to someone who is infected. And it can spread when you touch something that has the virus on it, such as a doorknob or a tabletop. What can you do to protect yourself from coronavirus (COVID-19)? The best way to protect yourself from getting sick is to:  · Avoid areas where there is an outbreak. · Avoid contact with people who may be infected. · Wash your hands often with soap or alcohol-based hand sanitizers. · Avoid crowds and try to stay at least 6 feet away from other people. · Wash your hands often, especially after you cough or sneeze. Use soap and water, and scrub for at least 20 seconds. If soap and water aren't available, use an alcohol-based hand . · Avoid touching your mouth, nose, and eyes. What can you do to avoid spreading the virus to others? To help avoid spreading the virus to others:  · Cover your mouth with a tissue when you cough or sneeze. Then throw the tissue in the trash. · Use a disinfectant to clean things that you touch often. · Stay home if you are sick or have been exposed to the virus. Don't go to school, work, or public areas.  And don't use public transportation. · If you are sick:  ? Leave your home only if you need to get medical care. But call the doctor's office first so they know you're coming. And wear a face mask, if you have one.  ? If you have a face mask, wear it whenever you're around other people. It can help stop the spread of the virus when you cough or sneeze. ? Clean and disinfect your home every day. Use household  and disinfectant wipes or sprays. Take special care to clean things that you grab with your hands. These include doorknobs, remote controls, phones, and handles on your refrigerator and microwave. And don't forget countertops, tabletops, bathrooms, and computer keyboards. When to call for help  Call 911 anytime you think you may need emergency care. For example, call if:  · You have severe trouble breathing. (You can't talk at all.)  · You have constant chest pain or pressure. · You are severely dizzy or lightheaded. · You are confused or can't think clearly. · Your face and lips have a blue color. · You pass out (lose consciousness) or are very hard to wake up. Call your doctor now if you develop symptoms such as:  · Shortness of breath. · Fever. · Cough. If you need to get care, call ahead to the doctor's office for instructions before you go. Make sure you wear a face mask, if you have one, to prevent exposing other people to the virus. Where can you get the latest information? The following health organizations are tracking and studying this virus. Their websites contain the most up-to-date information. Yeni Patience also learn what to do if you think you may have been exposed to the virus. · U.S. Centers for Disease Control and Prevention (CDC): The CDC provides updated news about the disease and travel advice. The website also tells you how to prevent the spread of infection. www.cdc.gov  · World Health Organization [facility]): WHO offers information about the virus outbreaks. WHO also has travel advice. www.who.int  Current as of: April 1, 2020               Content Version: 12.4  © 0801-0695 [employer]orporated. Care instructions adapted under license by your healthcare professional. If you have questions about a medical condition or this instruction, always ask your healthcare professional. [address] any warranty or liability for your use of this information.

## 2021-11-11 NOTE — PROCEDURES
[address] [facility]  174 [facility], [facility] (EGD) Procedure Note    Deandre ANNE [facility]  1950  [id number]      Procedure: Endoscopic Gastroduodenoscopy with esophageal dilation    Indication: dysphagia    Pre-operative Diagnosis: see indication above    Post-operative Diagnosis: see findings below    : Diane Liang. Tramaine Beckahm MD    Staff: Endoscopy Technician-1: Gifty Strong  Endoscopy RN-1: Roberto Bocanegra RN     Referring Provider:  Lilian Oliver MD      Anesthesia/Sedation:  MAC anesthesia Propofol        Procedure Details     After informed consent was obtained for the procedure, with all risks and benefits of procedure explained the patient was taken to the endoscopy suite and placed in the left lateral decubitus position. Following sequential administration of sedation as per above, the endoscope was inserted into the mouth and advanced under direct vision to second portion of the duodenum. A careful inspection was made as the gastroscope was withdrawn, including a retroflexed view of the proximal stomach; findings and interventions are described below. Findings:   Esophagus: suspect Zenker diverticulum in cervical esophageal region. A Schatzki ring was seen in distal esophagus. Dilation occurred with scope passage (9.2 mm). Additional dilation was effected to 10 mm with CRE balloon. Further dilation was deferred due to mucosal renting. Stomach: 4-5 cm hiatal hernia. Otherwise normal stomach. Duodenum: normal to second portion      Therapies: as above    Specimens: none         EBL: None      Complications:   None; patient tolerated the procedure well. Impression:    1. Distal esophageal Schatzki ring - dilated as above  2. 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 Large hiatal hernia    Recommendations:  1. Advance diet as tolerated. 2. Modified barium swallow to be scheduled  3.  Pantoprazole 40 mg daily for 2 months  4. Based on MBS results, we can decide on referral for endoscopic Zenker diverticulotomy  5. Consider repeat dilation of distal esophagus based on clinical course    Signed By: Karo De Anda.  Elvera Buerger, MD     11/11/2021  11:30 AM

## 2021-11-11 NOTE — PERIOP NOTES

## 2021-11-11 NOTE — ANESTHESIA POSTPROCEDURE EVALUATION
Post-Anesthesia Evaluation and Assessment    Patient: Severa Blue MRN: [id number]  SSN: [id number]    YOB: 1950  Age: 80 y.o. Sex: male      I have evaluated the patient and they are stable and ready for discharge from the PACU. Cardiovascular Function/Vital Signs  Visit Vitals  BP (!) 117/59   Pulse 71   Temp 36.5 °C (97.7 °F)   Resp 15   Ht 6' 1\" (1.854 m)   Wt 77.1 kg (170 lb)   SpO2 95%   BMI 22.43 kg/m²       Patient is status post MAC anesthesia for Procedure(s):  ESOPHAGOGASTRODUODENOSCOPY (EGD)   :-  ESOPHAGEAL DILATION. Nausea/Vomiting: None    Postoperative hydration reviewed and adequate. Pain:  Pain Scale 1: Numeric (0 - 10) (11/11/21 1047)  Pain Intensity 1: 0 (11/11/21 1047)   Managed    Neurological Status: At baseline    Mental Status, Level of Consciousness: Alert and  oriented to person, place, and time    Pulmonary Status:   O2 Device: None (Room air) (11/11/21 1145)   Adequate oxygenation and airway patent    Complications related to anesthesia: None    Post-anesthesia assessment completed. No concerns    Signed By: iTrso Celaya MD     November 11, 2021              Procedure(s):  ESOPHAGOGASTRODUODENOSCOPY (EGD)   :-  ESOPHAGEAL DILATION. MAC    <BSHSIANPOST>    INITIAL Post-op Vital signs:   Vitals Value Taken Time   /59 11/11/21 1145   Temp 36.5 °C (97.7 °F) 11/11/21 1125   Pulse 72 11/11/21 1147   Resp 19 11/11/21 1147   SpO2 97 % 11/11/21 1147   Vitals shown include unvalidated device data.

## 2021-11-11 NOTE — H&P
[address], [address] [place] [address]      History and Physical       NAME:  Charito Mayo   :   1950   MRN:   [id number]             History of Present Illness:  Patient is a 80 y.o. who is seen for dysphagia. PMH:  Past Medical History:   Diagnosis Date    Cancer [facility])     Encounter for screening colonoscopy 2013    Dr Frank Salazar - biopsies performed and determined to be normal - repeat in 5 years    Hiatal hernia     HLD (hyperlipidemia)     pt states he was placed on lipitor d/t plaque on abdominal aorta not elevated cholesterol    Ill-defined condition     hx malaria in 1970s    Ill-defined condition     eczema    Neuropathic pain, leg, right     Osteopenia     DEXA 17    Schatzki's ring     Spinal stenosis     DJD, stenosis, cyst along L5       PSH:  Past Surgical History:   Procedure Laterality Date    COLONOSCOPY Left 2018    COLONOSCOPY performed by Enedina Gonzáles. Mayo Soto MD [facility] ENDOSCOPY    HX CATARACT REMOVAL Bilateral     HX COLONOSCOPY  2013    polyps    HX ENDOSCOPY  2017    HX LUMBAR FUSION      HX OTHER SURGICAL      BCCA removed       Allergies:  No Known Allergies    Home Medications:  Prior to Admission Medications   Prescriptions Last Dose Informant Patient Reported? Taking? Lactobacillus acidophilus (PROBIOTIC PO) 11/10/2021 at Unknown time  Yes Yes   Sig: Take  by mouth. Takes one po once daily. VITAMIN B COMPLEX PO 11/10/2021 at Unknown time  Yes Yes   Sig: Take  by mouth. Takes one po once daily. ascorbic acid, vitamin C, (Vitamin C) 1,000 mg tablet 11/10/2021 at Unknown time  Yes Yes   Sig: Take 1,000 mg by mouth daily. atorvastatin (LIPITOR) 10 mg tablet 11/10/2021 at Unknown time  No Yes   Sig: Take 0.5 Tabs by mouth daily. atorvastatin (LIPITOR) 10 mg tablet 11/10/2021 at Unknown time  Yes Yes   Sig: Take 10 mg by mouth every other day.    calcium carbonate/vitamin D3 (CALCIUM + D PO) 11/10/2021 at Unknown time  Yes Yes   Sig: Take 1 Tablet by mouth two (2) times a day. triamcinolone acetonide (KENALOG) 0.1 % ointment 11/10/2021 at Unknown time  No Yes   Sig: Apply  to affected area two (2) times daily as needed for Skin Irritation or Itching. use thin layer      Facility-Administered Medications: None       Hospital Medications:  Current Facility-Administered Medications   Medication Dose Route Frequency    0.9% sodium chloride infusion  50 mL/hr IntraVENous CONTINUOUS    sodium chloride (NS) flush 5-40 mL  5-40 mL IntraVENous Q8H    sodium chloride (NS) flush 5-40 mL  5-40 mL IntraVENous PRN    midazolam (VERSED) injection 0.25-5 mg  0.25-5 mg IntraVENous Multiple    fentaNYL citrate (PF) injection  mcg   mcg IntraVENous Multiple    naloxone (NARCAN) injection 0.4 mg  0.4 mg IntraVENous Multiple    flumazeniL (ROMAZICON) 0.1 mg/mL injection 0.2 mg  0.2 mg IntraVENous Multiple    simethicone (MYLICON) 45FO/3.8PQ oral drops 80 mg  1.2 mL Oral Multiple    atropine injection 0.5 mg  0.5 mg IntraVENous ONCE PRN    EPINEPHrine (ADRENALIN) 0.1 mg/mL syringe 1 mg  1 mg Endoscopically ONCE PRN       Social History:  Social History     Tobacco Use    Smoking status: Former Smoker    Smokeless tobacco: Never Used    Tobacco comment: quit over 30 yrs ago   Substance Use Topics    Alcohol use:  Yes     Alcohol/week: 1.0 standard drink     Types: 1 Shots of liquor per week       Family History:  Family History   Problem Relation Age of Onset    COPD Father     Stomach Cancer Maternal Grandfather              Review of Systems:      Constitutional: negative fever, negative chills, negative weight loss  Eyes:   negative visual changes  ENT:   negative sore throat, tongue or lip swelling  Respiratory:  negative cough, negative dyspnea  Cards:  negative for chest pain, palpitations, lower extremity edema  GI:   See HPI  :  negative for frequency, dysuria  Integument:  negative for rash and pruritus  Heme:  negative for easy bruising and gum/nose bleeding  Musculoskel: negative for myalgias,  back pain and muscle weakness  Neuro: negative for headaches, dizziness, vertigo  Psych:  negative for feelings of anxiety, depression       Objective:     Patient Vitals for the past 8 hrs:   BP Temp Pulse Resp SpO2 Height Weight   11/11/21 1047 122/69 98.9 °F (37.2 °C) 70 16 99 % -- --   11/11/21 1046 -- -- -- -- -- 6' 1\" (1.854 m) 77.1 kg (170 lb)     No intake/output data recorded. No intake/output data recorded. EXAM:     NEURO-a&o   HEENT-wnl   LUNGS-clear    COR-regular rate and rhythym     ABD-soft , no tenderness, no rebound, good bs     EXT-no edema     Data Review     No results for input(s): WBC, HGB, HCT, PLT, HGBEXT, HCTEXT, PLTEXT in the last 72 hours. No results for input(s): NA, K, CL, CO2, BUN, CREA, GLU, PHOS, CA in the last 72 hours. No results for input(s): AP, TBIL, TP, ALB, GLOB, GGT, AML, LPSE in the last 72 hours. No lab exists for component: SGOT, GPT, AMYP, HLPSE  No results for input(s): INR, PTP, APTT, INREXT in the last 72 hours. Assessment:     · dysphagia     Patient Active Problem List   Diagnosis Code    HLD (hyperlipidemia) E78.5    Osteopenia M85.80    Spinal stenosis M48.00    Hiatal hernia K44.9    Schatzki's ring K22.2    Neuropathic pain, leg, right M79.2     Plan:   · The patient was counseled at length about the risks of cldye Covid-19 in the desiree-operative and post-operative states including the recovery window of their procedure. The patient was made aware that clyde Covid-19 after a surgical procedure may worsen their prognosis for recovering from the virus and lend to a higher morbidity and or mortality risk. The patient was given the options of postponing their procedure. All of the risks, benefits, and alternatives were discussed. The patient does wish to proceed with the procedure.   · Endoscopic procedure with MAC Signed By: Adis Blanton.  Tea Thompson MD     11/11/2021  11:07 AM

## 2021-12-01 ENCOUNTER — TRANSCRIBE ORDER (OUTPATIENT)
Dept: SCHEDULING | Age: 71
End: 2021-12-01

## 2021-12-01 DIAGNOSIS — R13.10 DYSPHAGIA: Primary | ICD-10-CM

## 2021-12-01 DIAGNOSIS — K22.2 LOWER ESOPHAGEAL RING (SCHATZKI): ICD-10-CM

## 2021-12-21 ENCOUNTER — HOSPITAL ENCOUNTER (OUTPATIENT)
Dept: GENERAL RADIOLOGY | Age: 71
Discharge: HOME | End: 2021-12-21
Attending: INTERNAL MEDICINE
Payer: MEDICARE

## 2021-12-21 DIAGNOSIS — K22.2 LOWER ESOPHAGEAL RING (SCHATZKI): ICD-10-CM

## 2021-12-21 DIAGNOSIS — R13.10 DYSPHAGIA: ICD-10-CM

## 2021-12-21 PROCEDURE — 92611 MOTION FLUOROSCOPY/SWALLOW: CPT

## 2021-12-21 PROCEDURE — 74230 X-RAY XM SWLNG FUNCJ C+: CPT

## 2021-12-21 NOTE — PROGRESS NOTES
[address], [address] STUDY  Patient: Scottie Taveras (89 y.o. male)  Date: 12/21/2021  Referring Provider:  Jeb Cortez:   Patient reports h/o esophageal dilatation in 2018. When he had similar symptoms, he received another EGD with esophageal dilatation and GI noted possible Zenckers diverticulum. GI also noted Schatzki's ring. Patient c/o pill dysphagia and hard solid dysphagia in the last month or two. OBJECTIVE:   Past Medical History:   Past Medical History:   Diagnosis Date    Cancer [facility])     Encounter for screening colonoscopy 09/13/2013    Dr Greta Hargrove - biopsies performed and determined to be normal - repeat in 5 years    Hiatal hernia     HLD (hyperlipidemia)     pt states he was placed on lipitor d/t plaque on abdominal aorta not elevated cholesterol    Ill-defined condition     hx malaria in 1970s    Ill-defined condition     eczema    Neuropathic pain, leg, right     Osteopenia     DEXA 1/25/17    Schatzki's ring     Spinal stenosis     DJD, stenosis, cyst along L5     Past Surgical History:   Procedure Laterality Date    COLONOSCOPY Left 7/19/2018    COLONOSCOPY performed by Nishi Carvalho. Steven Khan MD at [facility] ENDOSCOPY    HX CATARACT REMOVAL Bilateral     HX COLONOSCOPY  09/13/2013    polyps    HX ENDOSCOPY  08/2017    HX LUMBAR FUSION      HX OTHER SURGICAL      BCCA removed     Current Dietary Status:  Regular, thins  Radiologist: Paras  Film Views: Lateral  Patient Position: upright standing    Trial 1: Trial 2:   Consistency Presented: Thin liquid; Solid;Puree;Pill/Tablet     How Presented: Self-fed/presented;Spoon;Straw;Successive swallows      ORAL PHASE:      Bolus Acceptance: No impairment     Bolus Formation/Control: No impairment:    :     Propulsion: No impairment     Oral Residue: None   PHARYNGEAL PHASE:      Initiation of Swallow: No impairment     Timing: No impairment     Penetration: None     Aspiration/Timing: No evidence of aspiration     Pharyngeal Clearance:  (initially none. then material refluxed back into pyriforms from Zencker's diverticulum. he was able to re-swallow)                               Trial 3: Trial 4:                            :    :                                                                        Decreased Tongue Base Retraction?: No  Laryngeal Elevation: WFL (within functional limits)  Aspiration/Penetration Score:  (occasional undercoating of epiglottis)  Pharyngeal Symmetry: Not assessed  Pharyngeal-Esophageal Segment: Zenker's Diverticulum (moderate to large)   We finished the swallow test with water, which cleared barium out of Zenckers. ASSESSMENT :  Based on the objective data described above, the patient presents with normal oral-pharyngeal swallow. 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 PLAN/RECOMMENDATIONS :  Continue diet as tolerated. End meal with water sips to have water as the residual in Zenckers if possible. Follow up with GI for possible surgical candidacy for Zenckers diverticulum. A disc was provided to patient. Pictures are available on PACS system. COMMUNICATION/EDUCATION:   The above findings and recommendations were discussed with: patient who verbalized understanding.     Thank you for this referral.  Daniel Chong, SLP  Time Calculation: 20 mins

## 2022-02-14 ENCOUNTER — OFFICE VISIT (OUTPATIENT)
Dept: FAMILY MEDICINE CLINIC | Age: 72
End: 2022-02-14
Payer: MEDICARE

## 2022-02-14 VITALS
DIASTOLIC BLOOD PRESSURE: 63 MMHG | TEMPERATURE: 98.5 F | HEART RATE: 87 BPM | RESPIRATION RATE: 16 BRPM | SYSTOLIC BLOOD PRESSURE: 120 MMHG | HEIGHT: 73 IN | WEIGHT: 167 LBS | BODY MASS INDEX: 22.13 KG/M2 | OXYGEN SATURATION: 96 %

## 2022-02-14 DIAGNOSIS — R06.09 DYSPNEA ON EXERTION: ICD-10-CM

## 2022-02-14 DIAGNOSIS — K22.5 ZENKER DIVERTICULUM: ICD-10-CM

## 2022-02-14 DIAGNOSIS — R05.3 CHRONIC COUGH: Primary | ICD-10-CM

## 2022-02-14 DIAGNOSIS — R93.89 ABNORMAL X-RAY: ICD-10-CM

## 2022-02-14 PROCEDURE — 1101F PT FALLS ASSESS-DOCD LE1/YR: CPT | Performed by: FAMILY MEDICINE

## 2022-02-14 PROCEDURE — 99214 OFFICE O/P EST MOD 30 MIN: CPT | Performed by: FAMILY MEDICINE

## 2022-02-14 PROCEDURE — G8536 NO DOC ELDER MAL SCRN: HCPCS | Performed by: FAMILY MEDICINE

## 2022-02-14 PROCEDURE — G8427 DOCREV CUR MEDS BY ELIG CLIN: HCPCS | Performed by: FAMILY MEDICINE

## 2022-02-14 PROCEDURE — G8420 CALC BMI NORM PARAMETERS: HCPCS | Performed by: FAMILY MEDICINE

## 2022-02-14 PROCEDURE — G8432 DEP SCR NOT DOC, RNG: HCPCS | Performed by: FAMILY MEDICINE

## 2022-02-14 PROCEDURE — 3017F COLORECTAL CA SCREEN DOC REV: CPT | Performed by: FAMILY MEDICINE

## 2022-02-14 RX ORDER — ALBUTEROL SULFATE 90 UG/1
1 AEROSOL, METERED RESPIRATORY (INHALATION)
Qty: 18 G | Refills: 2 | Status: SHIPPED | OUTPATIENT
Start: 2022-02-14

## 2022-02-14 NOTE — PROGRESS NOTES
Chief Complaint   Patient presents with    Cough        1. \"Have you been to the ER, urgent care clinic since your last visit? Hospitalized since your last visit? \" Yes Reason for visit: Cough    2. \"Have you seen or consulted any other health care providers outside of the [address] [place] [address] since your last visit? \" No     3. For patients aged 39-70: Has the patient had a colonoscopy? Yes - no Care Gap present     If the patient is female:    4. For patients aged 41-77: Has the patient had a mammogram within the past 2 years? NA - based on age    11. For patients aged 21-30: Has the patient had a pap smear?  NA - based on age    1 most recent PHQ Screens 2/14/2022   Little interest or pleasure in doing things Not at all   Feeling down, depressed, irritable, or hopeless Not at all   Total Score PHQ 2 0

## 2022-02-14 NOTE — PROGRESS NOTES
[facility] Carmenpedro luis  80 y.o. male  1950  [address] [place] [address] Drive [address]  [phone]     [facility]       Encounter Date: 2/14/2022           Established Patient Visit Note: Donn Stack MD    Reason for Appointment:  Chief Complaint   Patient presents with    Cough         History of Present Illness:  History provided by patient    Jasmeet Parson is a 80 y.o. male who presents to clinic today for:     HEATH, Pneumonia, chronic cough, abnormal xray: started around Nov, 2021. [facility] for symptoms of cough, painful urination , and feeling feverish. He had xray that showed possible emphysematous changes  And increased interstitial and bronchopulmonary markings, He also had abnormal UA c/w UTI. Sherryle Moder He was treated with bactrim, but this was changed to Augmentin d/t sensitivities showing bacterial resistance to bactrim. He was given an inhaler, which he reports as helpful for his symptoms. He has hx of smoking (Age 13 to 28 at 1 PPD)  He reports that when going for a brisk walk, he will be a little more short of breath. He denies any chest pain, shoulder pain, or ear pain a/w the dyspnea. He reports having cardiology evaluation around age 37 for pre-exercise clearance, but he has not had any heart evaluation since that time. Zenker's diverticulum: he reports that he is schedule for procedure for this with Dr. Jonathan Hood on 3/1/21    Review of Systems  See HPI      Allergies: Patient has no known allergies. Medications:     Current Outpatient Medications:     albuterol (PROVENTIL HFA, VENTOLIN HFA, PROAIR HFA) 90 mcg/actuation inhaler, Take 1 Puff by inhalation every four (4) hours as needed for Wheezing or Shortness of Breath., Disp: 18 g, Rfl: 2    atorvastatin (LIPITOR) 10 mg tablet, Take 10 mg by mouth every other day., Disp: , Rfl:     ascorbic acid, vitamin C, (Vitamin C) 1,000 mg tablet, Take 1,000 mg by mouth daily. , Disp: , Rfl:     triamcinolone acetonide (KENALOG) 0.1 % ointment, Apply  to affected area two (2) times daily as needed for Skin Irritation or Itching. use thin layer, Disp: 30 g, Rfl: 0    calcium carbonate/vitamin D3 (CALCIUM + D PO), Take 1 Tablet by mouth two (2) times a day., Disp: , Rfl:     VITAMIN B COMPLEX PO, Take  by mouth. Takes one po once daily. , Disp: , Rfl:     Lactobacillus acidophilus (PROBIOTIC PO), Take  by mouth. Takes one po once daily. , Disp: , Rfl:     atorvastatin (LIPITOR) 10 mg tablet, Take 0.5 Tabs by mouth daily. , Disp: 45 Tab, Rfl: 3    History  Patient Care Team:  Gorge Fay MD as PCP - General (Family Medicine)  Gorge Fay MD as PCP - [address] [place] [address] Provider  Victor M Paige MD (Orthopedic Surgery)  Sincere Escudero MD as Physician (Gastroenterology)    Past Medical History: he has a past medical history of Cancer [facility]), Encounter for screening colonoscopy (09/13/2013), Hiatal hernia, HLD (hyperlipidemia), Ill-defined condition, Ill-defined condition, Neuropathic pain, leg, right, Osteopenia, Schatzki's ring, and Spinal stenosis. He has no past medical history of Adverse effect of anesthesia or Sleep apnea. Past Surgical History: he has a past surgical history that includes hx endoscopy (08/2017); hx colonoscopy (09/13/2013); colonoscopy (Left, 7/19/2018); hx lumbar fusion; hx cataract removal (Bilateral); and hx other surgical.    Family Medical History: family history includes COPD in his father; Stomach Cancer in his maternal grandfather. Social History: he reports that he has quit smoking. He has never used smokeless tobacco. He reports current alcohol use of about 1.0 standard drink of alcohol per week. He reports that he does not use drugs.         Objective:   Visit Vitals  /63   Pulse 87   Temp 98.5 °F (36.9 °C)   Resp 16   Ht 6' 1\" (1.854 m)   Wt 167 lb (75.8 kg)   SpO2 96%   BMI 22.03 kg/m²     Wt Readings from Last 3 Encounters:   02/14/22 167 lb (75.8 kg)   11/11/21 170 lb (77.1 kg)   03/22/21 181 lb (82.1 kg)       Physical Exam  Constitutional:       Appearance: Normal appearance. HENT:      Head: Normocephalic and atraumatic. Right Ear: External ear normal.      Left Ear: External ear normal.      Nose: Nose normal.      Mouth/Throat:      Pharynx: No oropharyngeal exudate. Eyes:      General: No scleral icterus. Right eye: No discharge. Left eye: No discharge. Conjunctiva/sclera: Conjunctivae normal.      Pupils: Pupils are equal, round, and reactive to light. Neck:      Thyroid: No thyromegaly. Vascular: No JVD. Trachea: No tracheal deviation. Cardiovascular:      Rate and Rhythm: Normal rate and regular rhythm. Heart sounds: Normal heart sounds. No murmur heard. No friction rub. No gallop. Pulmonary:      Effort: Pulmonary effort is normal. No respiratory distress. Breath sounds: Normal breath sounds. No stridor. No wheezing. Musculoskeletal:         General: No tenderness or deformity. Normal range of motion. Cervical back: Normal range of motion and neck supple. Lymphadenopathy:      Cervical: No cervical adenopathy. Skin:     General: Skin is warm and dry. Neurological:      Mental Status: He is alert. Cranial Nerves: No cranial nerve deficit. Coordination: Coordination normal.      Gait: Gait is intact. Deep Tendon Reflexes: Reflexes normal.         Assessment & Plan:      ICD-10-CM ICD-9-CM    1. Chronic cough  R05.3 786.2 CT CHEST W WO CONT   2. Dyspnea on exertion  R06.00 786.09 REFERRAL TO CARDIOLOGY      albuterol (PROVENTIL HFA, VENTOLIN HFA, PROAIR HFA) 90 mcg/actuation inhaler      REFERRAL TO PULMONARY DISEASE      CBC W/O DIFF      METABOLIC PANEL, COMPREHENSIVE      TSH 3RD GENERATION      CT CHEST W WO CONT      CBC W/O DIFF      METABOLIC PANEL, COMPREHENSIVE      TSH 3RD GENERATION   3. Abnormal x-ray  R93.89 793.99 CT CHEST W WO CONT   4.  Zenker diverticulum  K22.5 530.6      · Chronic cough, HEATH, and Abnormal Xray: Epigastric Pain: Chronic, uncontrolled. Evaluate further as above and referral to cardiology and pulmonology for further evaluaton. Discussed red flag symptoms and reasons to call or go to ED  · Zenker Diverticulum: Chronic, stable. Follow up with specialist as scheduled. I have discussed the diagnosis with the patient and the intended plan as seen in the above orders. The patient has received an after-visit summary along with patient information handout. I have discussed medication side effects and warnings with the patient as well. Disposition  Follow-up and Dispositions    · Return if symptoms worsen or fail to improve, for as scheduled.            Arianna Novak MD

## 2022-02-15 LAB
ALBUMIN SERPL-MCNC: 4.4 G/DL (ref 3.8–4.8)
ALBUMIN/GLOB SERPL: 1.7 {RATIO} (ref 1.2–2.2)
ALP SERPL-CCNC: 56 IU/L (ref 44–121)
ALT SERPL-CCNC: 41 IU/L (ref 0–44)
AST SERPL-CCNC: 82 IU/L (ref 0–40)
BILIRUB SERPL-MCNC: 0.5 MG/DL (ref 0–1.2)
BUN SERPL-MCNC: 12 MG/DL (ref 8–27)
BUN/CREAT SERPL: 10 (ref 10–24)
CALCIUM SERPL-MCNC: 9.6 MG/DL (ref 8.6–10.2)
CHLORIDE SERPL-SCNC: 105 MMOL/L (ref 96–106)
CO2 SERPL-SCNC: 22 MMOL/L (ref 20–29)
CREAT SERPL-MCNC: 1.2 MG/DL (ref 0.76–1.27)
ERYTHROCYTE [DISTWIDTH] IN BLOOD BY AUTOMATED COUNT: 13.1 % (ref 11.6–15.4)
GLOBULIN SER CALC-MCNC: 2.6 G/DL (ref 1.5–4.5)
GLUCOSE SERPL-MCNC: 88 MG/DL (ref 65–99)
HCT VFR BLD AUTO: 45.6 % (ref 37.5–51)
HGB BLD-MCNC: 15.4 G/DL (ref 13–17.7)
MCH RBC QN AUTO: 32.2 PG (ref 26.6–33)
MCHC RBC AUTO-ENTMCNC: 33.8 G/DL (ref 31.5–35.7)
MCV RBC AUTO: 95 FL (ref 79–97)
PLATELET # BLD AUTO: 218 X10E3/UL (ref 150–450)
POTASSIUM SERPL-SCNC: 4.4 MMOL/L (ref 3.5–5.2)
PROT SERPL-MCNC: 7 G/DL (ref 6–8.5)
RBC # BLD AUTO: 4.78 X10E6/UL (ref 4.14–5.8)
SODIUM SERPL-SCNC: 141 MMOL/L (ref 134–144)
TSH SERPL DL<=0.005 MIU/L-ACNC: 3.16 UIU/ML (ref 0.45–4.5)
WBC # BLD AUTO: 8.3 X10E3/UL (ref 3.4–10.8)

## 2022-02-16 DIAGNOSIS — R79.89 ELEVATED LFTS: Primary | ICD-10-CM

## 2022-02-16 DIAGNOSIS — R74.8 ABNORMAL LEVELS OF OTHER SERUM ENZYMES: ICD-10-CM

## 2022-02-16 DIAGNOSIS — Z11.59 ENCOUNTER FOR SCREENING FOR OTHER VIRAL DISEASES: ICD-10-CM

## 2022-02-17 NOTE — PROGRESS NOTES
Please let patient know that his AST (liver lab) is a little elevated. I would like to evaluate this further with additional lab work and imaging. I have placed an ordered for an ultrasound and lab work. Please provide him with the phone number for central scheduling, and please schedule him for lab visit. The remainder of his labs are stable.

## 2022-02-21 ENCOUNTER — APPOINTMENT (OUTPATIENT)
Dept: FAMILY MEDICINE CLINIC | Age: 72
End: 2022-02-21

## 2022-02-22 ENCOUNTER — HOSPITAL ENCOUNTER (OUTPATIENT)
Dept: ULTRASOUND IMAGING | Age: 72
Discharge: HOME | End: 2022-02-22
Attending: FAMILY MEDICINE
Payer: MEDICARE

## 2022-02-22 DIAGNOSIS — R79.89 ELEVATED LFTS: ICD-10-CM

## 2022-02-22 PROCEDURE — 76705 ECHO EXAM OF ABDOMEN: CPT

## 2022-02-23 LAB
ALBUMIN SERPL-MCNC: 4.4 G/DL (ref 3.8–4.8)
ALBUMIN/GLOB SERPL: 1.7 {RATIO} (ref 1.2–2.2)
ALP SERPL-CCNC: 56 IU/L (ref 44–121)
ALT SERPL-CCNC: 25 IU/L (ref 0–44)
AST SERPL-CCNC: 30 IU/L (ref 0–40)
BILIRUB SERPL-MCNC: 0.6 MG/DL (ref 0–1.2)
BUN SERPL-MCNC: 12 MG/DL (ref 8–27)
BUN/CREAT SERPL: 10 (ref 10–24)
CALCIUM SERPL-MCNC: 9.8 MG/DL (ref 8.6–10.2)
CERULOPLASMIN SERPL-MCNC: 23 MG/DL (ref 16–31)
CHLORIDE SERPL-SCNC: 103 MMOL/L (ref 96–106)
CO2 SERPL-SCNC: 20 MMOL/L (ref 20–29)
CREAT SERPL-MCNC: 1.25 MG/DL (ref 0.76–1.27)
FERRITIN SERPL-MCNC: 75 NG/ML (ref 30–400)
GGT SERPL-CCNC: 22 IU/L (ref 0–65)
GLOBULIN SER CALC-MCNC: 2.6 G/DL (ref 1.5–4.5)
GLUCOSE SERPL-MCNC: 115 MG/DL (ref 65–99)
HAV AB SER QL IA: NEGATIVE
HAV IGM SERPL QL IA: NEGATIVE
HBV CORE AB SERPL QL IA: NEGATIVE
HBV CORE IGM SERPL QL IA: NEGATIVE
HBV E AB SERPL QL IA: NEGATIVE
HBV E AG SERPL QL IA: NEGATIVE
HBV SURFACE AB SER QL: REACTIVE
HBV SURFACE AG SERPL QL IA: NEGATIVE
HCV AB S/CO SERPL IA: <0.1 S/CO RATIO (ref 0–0.9)
HCV AB SERPL QL IA: NORMAL
INTERPRETATION: NORMAL
IRON SATN MFR SERPL: 42 % (ref 15–55)
IRON SERPL-MCNC: 131 UG/DL (ref 38–169)
POTASSIUM SERPL-SCNC: 4.3 MMOL/L (ref 3.5–5.2)
PROT SERPL-MCNC: 7 G/DL (ref 6–8.5)
SODIUM SERPL-SCNC: 140 MMOL/L (ref 134–144)
TIBC SERPL-MCNC: 309 UG/DL (ref 250–450)
UIBC SERPL-MCNC: 178 UG/DL (ref 111–343)

## 2022-02-23 NOTE — PROGRESS NOTES
Dear Frank Treviño,    Your imaging did not identify any abnormality. If you have any questions, please feel free to call our office at [phone].      Yuki Lao MD

## 2022-02-24 NOTE — PROGRESS NOTES
Dear Chitra Morales,    Your liver function is back to normal, but I do recommend that you obtain the ultrasound as previously ordered. Your kidney function is down just a little. We will continue to monitor this. Your labs show that you need a repeat hepatitis A vaccine. You can schedule a visit with our office to have this performed.  The remainder of your labs are normal.     Burnett Osgood, MD

## 2022-03-04 ENCOUNTER — OFFICE VISIT (OUTPATIENT)
Dept: CARDIOLOGY CLINIC | Age: 72
End: 2022-03-04
Payer: MEDICARE

## 2022-03-04 VITALS
HEIGHT: 73 IN | WEIGHT: 169.4 LBS | OXYGEN SATURATION: 97 % | RESPIRATION RATE: 14 BRPM | BODY MASS INDEX: 22.45 KG/M2 | HEART RATE: 78 BPM | DIASTOLIC BLOOD PRESSURE: 60 MMHG | SYSTOLIC BLOOD PRESSURE: 100 MMHG

## 2022-03-04 DIAGNOSIS — Z87.891 HISTORY OF TOBACCO USE: ICD-10-CM

## 2022-03-04 DIAGNOSIS — K22.5 ZENKER'S DIVERTICULUM: ICD-10-CM

## 2022-03-04 DIAGNOSIS — R06.02 SHORTNESS OF BREATH: Primary | ICD-10-CM

## 2022-03-04 DIAGNOSIS — R05.3 CHRONIC COUGH: ICD-10-CM

## 2022-03-04 PROCEDURE — 99204 OFFICE O/P NEW MOD 45 MIN: CPT | Performed by: INTERNAL MEDICINE

## 2022-03-04 PROCEDURE — G8427 DOCREV CUR MEDS BY ELIG CLIN: HCPCS | Performed by: INTERNAL MEDICINE

## 2022-03-04 PROCEDURE — 93000 ELECTROCARDIOGRAM COMPLETE: CPT | Performed by: INTERNAL MEDICINE

## 2022-03-04 PROCEDURE — G8510 SCR DEP NEG, NO PLAN REQD: HCPCS | Performed by: INTERNAL MEDICINE

## 2022-03-04 PROCEDURE — 3017F COLORECTAL CA SCREEN DOC REV: CPT | Performed by: INTERNAL MEDICINE

## 2022-03-04 PROCEDURE — G8536 NO DOC ELDER MAL SCRN: HCPCS | Performed by: INTERNAL MEDICINE

## 2022-03-04 PROCEDURE — 1101F PT FALLS ASSESS-DOCD LE1/YR: CPT | Performed by: INTERNAL MEDICINE

## 2022-03-04 PROCEDURE — G8420 CALC BMI NORM PARAMETERS: HCPCS | Performed by: INTERNAL MEDICINE

## 2022-03-04 NOTE — PROGRESS NOTES
[facility] [place]: Sim Arteaga  [phone]    History of Present Illness:  Mr. Ashley Roblero is a 79 yo M with recent pneumonia in November, Zenker's diverticulum and he is going to have surgery for this with GI, Dr. Manuel Garcia on 03/22/2022, history of tobacco use quit in 1981, aortic plaquing on statin, referred by Dr. Daryn Shine for cardiac evaluation. In particular, he has had shortness of breath and chronic cough since November. His pneumonia at the time lasted about a week, but his chronic cough persisted after. He also had a kidney infection at the same time and fevers, but those resolved. He denies any exertional chest pain. He does go to the gym and does a mile daily. No significant palpitations, lightheadedness or dizziness. He does have some nasal congestion and uses Flonase. He is compensated on exam with clear lungs and no lower extremity edema. His EKG here was normal sinus rhythm, heart rate of 78 and nonspecific ST-T wave abnormality. Soc hx. Quit tobacco 1981  Fam hx. No early CAD  Assessment and Plan:   1. Shortness of breath, cough. Unclear etiology. Will obtain an echocardiogram and treadmill stress test to evaluate for any cardiac contribution, though suspicion for this is on the lower side. Part of his chronic cough sounds like it may be related to Zenker's diverticulum and he is going to have surgery on this in a few weeks. There is no cardiac reason why he cannot proceed with this. Additionally, he has some nasal congestion and recommended he take some antiallergy medication to see if this might help. 2. History of tobacco use. 3. Zenker's diverticulum. He is to undergo surgery. 4. History of tobacco use. 5. Aortic plaquing. This was noted per the patient on an ultrasound and he is on a statin for this.         He  has a past medical history of Cancer ([address] Encounter for screening colonoscopy (09/13/2013), Hiatal hernia, HLD (hyperlipidemia), Ill-defined condition, Ill-defined condition, Neuropathic pain, leg, right, Osteopenia, Schatzki's ring, and Spinal stenosis. He has no past medical history of Adverse effect of anesthesia or Sleep apnea. All other systems negative except as above. PE  Vitals:    03/04/22 1507   BP: 100/60   Pulse: 78   Resp: 14   SpO2: 97%   Weight: 169 lb 6.4 oz (76.8 kg)   Height: 6' 1\" (1.854 m)    Body mass index is 22.35 kg/m².    General appearance - alert, well appearing, and in no distress  Mental status - affect appropriate to mood  Eyes - sclera anicteric, moist mucous membranes  Neck - supple, no JVD  Chest - clear to auscultation, no wheezes, rales or rhonchi  Heart - normal rate, regular rhythm, normal S1, S2, no murmurs, rubs, clicks or gallops  Abdomen - soft, nontender, nondistended, no masses or organomegaly  Neurological -no focal deficit  Extremities - peripheral pulses normal, no pedal edema      Recent Labs:  Lab Results   Component Value Date/Time    Cholesterol, total 146 03/10/2021 09:13 AM    HDL Cholesterol 108 03/10/2021 09:13 AM    LDL, calculated 29.6 03/10/2021 09:13 AM    Triglyceride 42 03/10/2021 09:13 AM    CHOL/HDL Ratio 1.4 03/10/2021 09:13 AM     Lab Results   Component Value Date/Time    Creatinine 1.25 02/21/2022 01:43 PM     Lab Results   Component Value Date/Time    BUN 12 02/21/2022 01:43 PM     Lab Results   Component Value Date/Time    Potassium 4.3 02/21/2022 01:43 PM     No results found for: HBA1C, [id number], TNQ7IWFE  Lab Results   Component Value Date/Time    HGB 15.4 02/14/2022 12:00 AM     Lab Results   Component Value Date/Time    PLATELET 105 43/10/5155 12:00 AM       Reviewed:  Past Medical History:   Diagnosis Date    [address] [address]     Encounter for screening colonoscopy 09/13/2013    Dr Zia Morley - biopsies performed and determined to be normal - repeat in 5 years    Hiatal hernia     HLD (hyperlipidemia)     pt states he was placed on lipitor d/t plaque on abdominal aorta not elevated cholesterol  Ill-defined condition     hx malaria in 1970s    Ill-defined condition     eczema    Neuropathic pain, leg, right     Osteopenia     DEXA 1/25/17    Schatzki's ring     Spinal stenosis     DJD, stenosis, cyst along L5     Social History     Tobacco Use   Smoking Status Former Smoker   Smokeless Tobacco Never Used   Tobacco Comment    quit over 30 yrs ago     Social History     Substance and Sexual Activity   Alcohol Use Yes    Alcohol/week: 1.0 standard drink    Types: 1 Shots of liquor per week    Comment: occasional     No Known Allergies    Current Outpatient Medications   Medication Sig    albuterol (PROVENTIL HFA, VENTOLIN HFA, PROAIR HFA) 90 mcg/actuation inhaler Take 1 Puff by inhalation every four (4) hours as needed for Wheezing or Shortness of Breath.  atorvastatin (LIPITOR) 10 mg tablet Take 10 mg by mouth every other day.  ascorbic acid, vitamin C, (Vitamin C) 1,000 mg tablet Take 1,000 mg by mouth daily.  triamcinolone acetonide (KENALOG) 0.1 % ointment Apply  to affected area two (2) times daily as needed for Skin Irritation or Itching. use thin layer    calcium carbonate/vitamin D3 (CALCIUM + D PO) Take 1 Tablet by mouth two (2) times a day.  VITAMIN B COMPLEX PO Take  by mouth. Takes one po once daily.  Lactobacillus acidophilus (PROBIOTIC PO) Take  by mouth. Takes one po once daily. No current facility-administered medications for this visit.        Rakesh Paulino MD  [facility] [place]  [address] [facility] [address]  [address]

## 2022-03-09 ENCOUNTER — HOSPITAL ENCOUNTER (OUTPATIENT)
Dept: CT IMAGING | Age: 72
Discharge: HOME | End: 2022-03-09
Attending: FAMILY MEDICINE
Payer: MEDICARE

## 2022-03-09 DIAGNOSIS — R05.3 CHRONIC COUGH: ICD-10-CM

## 2022-03-09 DIAGNOSIS — R06.09 DYSPNEA ON EXERTION: ICD-10-CM

## 2022-03-09 DIAGNOSIS — R93.89 ABNORMAL X-RAY: ICD-10-CM

## 2022-03-09 PROCEDURE — 74011000636 HC RX REV CODE- 636: Performed by: FAMILY MEDICINE

## 2022-03-09 PROCEDURE — 71260 CT THORAX DX C+: CPT

## 2022-03-09 RX ADMIN — IOPAMIDOL 100 ML: 612 INJECTION, SOLUTION INTRAVENOUS at 13:00

## 2022-03-10 NOTE — PROGRESS NOTES
Called patient. He reports that his breathing has improved. Discussed results and advised him to discuss the results with both cardiology as well as pulmonology.

## 2022-04-12 ENCOUNTER — ANCILLARY PROCEDURE (OUTPATIENT)
Dept: CARDIOLOGY CLINIC | Age: 72
End: 2022-04-12
Payer: MEDICARE

## 2022-04-12 ENCOUNTER — ANCILLARY PROCEDURE (OUTPATIENT)
Dept: CARDIOLOGY CLINIC | Age: 72
End: 2022-04-12

## 2022-04-12 VITALS
BODY MASS INDEX: 22.4 KG/M2 | WEIGHT: 169 LBS | DIASTOLIC BLOOD PRESSURE: 70 MMHG | SYSTOLIC BLOOD PRESSURE: 122 MMHG | HEIGHT: 73 IN

## 2022-04-12 DIAGNOSIS — Z87.891 HISTORY OF TOBACCO USE: ICD-10-CM

## 2022-04-12 DIAGNOSIS — R05.3 CHRONIC COUGH: ICD-10-CM

## 2022-04-12 DIAGNOSIS — K22.5 ZENKER'S DIVERTICULUM: ICD-10-CM

## 2022-04-12 DIAGNOSIS — R06.02 SHORTNESS OF BREATH: ICD-10-CM

## 2022-04-12 LAB
ECHO AO ROOT DIAM: 4 CM
ECHO AO ROOT INDEX: 2 CM/M2
ECHO AV AREA PEAK VELOCITY: 3.6 CM2
ECHO AV AREA VTI: 3.6 CM2
ECHO AV AREA/BSA PEAK VELOCITY: 1.8 CM2/M2
ECHO AV AREA/BSA VTI: 1.8 CM2/M2
ECHO AV MEAN GRADIENT: 3 MMHG
ECHO AV MEAN VELOCITY: 0.8 M/S
ECHO AV PEAK GRADIENT: 5 MMHG
ECHO AV PEAK VELOCITY: 1.1 M/S
ECHO AV VELOCITY RATIO: 0.82
ECHO AV VTI: 20.9 CM
ECHO IVC PROX: 1.5 CM
ECHO LA DIAMETER INDEX: 1.65 CM/M2
ECHO LA DIAMETER: 3.3 CM
ECHO LA TO AORTIC ROOT RATIO: 0.83
ECHO LA VOL 2C: 40 ML (ref 18–58)
ECHO LA VOL 4C: 37 ML (ref 18–58)
ECHO LA VOL BP: 39 ML (ref 18–58)
ECHO LA VOL/BSA BIPLANE: 20 ML/M2 (ref 16–34)
ECHO LA VOLUME AREA LENGTH: 43 ML
ECHO LA VOLUME INDEX A2C: 20 ML/M2 (ref 16–34)
ECHO LA VOLUME INDEX A4C: 19 ML/M2 (ref 16–34)
ECHO LA VOLUME INDEX AREA LENGTH: 22 ML/M2 (ref 16–34)
ECHO LV E' LATERAL VELOCITY: 9 CM/S
ECHO LV E' SEPTAL VELOCITY: 6 CM/S
ECHO LV EDV A2C: 123 ML
ECHO LV EDV A4C: 136 ML
ECHO LV EDV BP: 129 ML (ref 67–155)
ECHO LV EDV INDEX A4C: 68 ML/M2
ECHO LV EDV INDEX BP: 65 ML/M2
ECHO LV EDV NDEX A2C: 62 ML/M2
ECHO LV EJECTION FRACTION A2C: 68 %
ECHO LV EJECTION FRACTION A4C: 60 %
ECHO LV EJECTION FRACTION BIPLANE: 62 % (ref 55–100)
ECHO LV ESV A2C: 39 ML
ECHO LV ESV A4C: 55 ML
ECHO LV ESV BP: 49 ML (ref 22–58)
ECHO LV ESV INDEX A2C: 20 ML/M2
ECHO LV ESV INDEX A4C: 28 ML/M2
ECHO LV ESV INDEX BP: 25 ML/M2
ECHO LV FRACTIONAL SHORTENING: 29 % (ref 28–44)
ECHO LV INTERNAL DIMENSION DIASTOLE INDEX: 2.4 CM/M2
ECHO LV INTERNAL DIMENSION DIASTOLIC: 4.8 CM (ref 4.2–5.9)
ECHO LV INTERNAL DIMENSION SYSTOLIC INDEX: 1.7 CM/M2
ECHO LV INTERNAL DIMENSION SYSTOLIC: 3.4 CM
ECHO LV IVSD: 1 CM (ref 0.6–1)
ECHO LV MASS 2D: 170.2 G (ref 88–224)
ECHO LV MASS INDEX 2D: 85.1 G/M2 (ref 49–115)
ECHO LV POSTERIOR WALL DIASTOLIC: 1 CM (ref 0.6–1)
ECHO LV RELATIVE WALL THICKNESS RATIO: 0.42
ECHO LVOT AREA: 4.2 CM2
ECHO LVOT AV VTI INDEX: 0.88
ECHO LVOT DIAM: 2.3 CM
ECHO LVOT MEAN GRADIENT: 2 MMHG
ECHO LVOT PEAK GRADIENT: 3 MMHG
ECHO LVOT PEAK VELOCITY: 0.9 M/S
ECHO LVOT STROKE VOLUME INDEX: 38.2 ML/M2
ECHO LVOT SV: 76.4 ML
ECHO LVOT VTI: 18.4 CM
ECHO MV A VELOCITY: 0.67 M/S
ECHO MV AREA PHT: 3.7 CM2
ECHO MV E DECELERATION TIME (DT): 203 MS
ECHO MV E VELOCITY: 0.48 M/S
ECHO MV E/A RATIO: 0.72
ECHO MV E/E' LATERAL: 5.33
ECHO MV E/E' RATIO (AVERAGED): 6.67
ECHO MV E/E' SEPTAL: 8
ECHO MV PRESSURE HALF TIME (PHT): 58.9 MS
ECHO RV TAPSE: 2.1 CM (ref 1.7–?)
STRESS ANGINA INDEX: 0
STRESS BASELINE DIAS BP: 70 MMHG
STRESS BASELINE HR: 96 BPM
STRESS BASELINE ST DEPRESSION: 0 MM
STRESS BASELINE SYS BP: 122 MMHG
STRESS ESTIMATED WORKLOAD: 10.1 METS
STRESS EXERCISE DUR MIN: 9 MIN
STRESS EXERCISE DUR SEC: 1 SEC
STRESS O2 SAT PEAK: 97 %
STRESS O2 SAT REST: 97 %
STRESS PEAK DIAS BP: 70 MMHG
STRESS PEAK SYS BP: 200 MMHG
STRESS PERCENT HR ACHIEVED: 93 %
STRESS POST PEAK HR: 139 BPM
STRESS RATE PRESSURE PRODUCT: NORMAL BPM*MMHG
STRESS SR DUKE TREADMILL SCORE: 9
STRESS ST DEPRESSION: 0 MM
STRESS TARGET HR: 150 BPM

## 2022-04-12 PROCEDURE — 93015 CV STRESS TEST SUPVJ I&R: CPT | Performed by: INTERNAL MEDICINE

## 2022-04-12 PROCEDURE — 93306 TTE W/DOPPLER COMPLETE: CPT | Performed by: INTERNAL MEDICINE

## 2022-04-20 NOTE — PROGRESS NOTES
Two patient identifiers verified. Per MD patient called and given results. Patient is moving to [facility]. He will call when they are settled and we can send records to new cards. Wished him him the best and advised to call if he has any problems before they move. Patient verbalized understanding and denies any further questions or concerns at this time.

## 2022-05-31 ENCOUNTER — OFFICE VISIT (OUTPATIENT)
Dept: FAMILY MEDICINE CLINIC | Age: 72
End: 2022-05-31
Payer: MEDICARE

## 2022-05-31 VITALS
HEIGHT: 73 IN | SYSTOLIC BLOOD PRESSURE: 120 MMHG | TEMPERATURE: 98.7 F | DIASTOLIC BLOOD PRESSURE: 64 MMHG | RESPIRATION RATE: 16 BRPM | BODY MASS INDEX: 21.76 KG/M2 | WEIGHT: 164.2 LBS | OXYGEN SATURATION: 97 % | HEART RATE: 81 BPM

## 2022-05-31 DIAGNOSIS — Z00.00 ENCOUNTER FOR MEDICARE ANNUAL WELLNESS EXAM: Primary | ICD-10-CM

## 2022-05-31 DIAGNOSIS — E78.5 HYPERLIPIDEMIA, UNSPECIFIED HYPERLIPIDEMIA TYPE: ICD-10-CM

## 2022-05-31 PROBLEM — N18.30 CHRONIC RENAL DISEASE, STAGE III (HCC): Status: ACTIVE | Noted: 2022-05-31

## 2022-05-31 PROCEDURE — G8420 CALC BMI NORM PARAMETERS: HCPCS | Performed by: FAMILY MEDICINE

## 2022-05-31 PROCEDURE — G8510 SCR DEP NEG, NO PLAN REQD: HCPCS | Performed by: FAMILY MEDICINE

## 2022-05-31 PROCEDURE — 1101F PT FALLS ASSESS-DOCD LE1/YR: CPT | Performed by: FAMILY MEDICINE

## 2022-05-31 PROCEDURE — G8427 DOCREV CUR MEDS BY ELIG CLIN: HCPCS | Performed by: FAMILY MEDICINE

## 2022-05-31 PROCEDURE — 3017F COLORECTAL CA SCREEN DOC REV: CPT | Performed by: FAMILY MEDICINE

## 2022-05-31 PROCEDURE — 1123F ACP DISCUSS/DSCN MKR DOCD: CPT | Performed by: FAMILY MEDICINE

## 2022-05-31 PROCEDURE — G0439 PPPS, SUBSEQ VISIT: HCPCS | Performed by: FAMILY MEDICINE

## 2022-05-31 PROCEDURE — G8536 NO DOC ELDER MAL SCRN: HCPCS | Performed by: FAMILY MEDICINE

## 2022-05-31 RX ORDER — FLUTICASONE FUROATE AND VILANTEROL TRIFENATATE 200; 25 UG/1; UG/1
POWDER RESPIRATORY (INHALATION)
COMMUNITY

## 2022-05-31 RX ORDER — ATORVASTATIN CALCIUM 10 MG/1
10 TABLET, FILM COATED ORAL EVERY OTHER DAY
Qty: 90 TABLET | Refills: 3 | Status: SHIPPED | OUTPATIENT
Start: 2022-05-31

## 2022-05-31 NOTE — PROGRESS NOTES
Chief Complaint   Patient presents with   [facility] Annual Wellness Visit       1. Have you been to the ER, urgent care clinic since your last visit? Hospitalized since your last visit? Yes When: 2022 Where: [facility]  Reason for visit: surgery for diverticulitis     2. Have you seen or consulted any other health care providers outside of the [address] [place] [address] T[place] since your last visit? Include any pap smears or colon screening. Yes When: 03/22 Where: Dr. Chad Roberts  Reason for visit: post op  Summer 2021; Dr. Majano [facility] basal cell removal    3. For patients over 45: Has the patient had a colonoscopy? Yes - no Care Gap present     If the patient is female:    4. For patients over 36: Has the patient had a mammogram? NA - based on age    11. For patients over 21: Has the patient had a pap smear? NA - based on age    1 most recent PHQ Screens 5/31/2022   Little interest or pleasure in doing things Not at all   Feeling down, depressed, irritable, or hopeless Not at all   Total Score PHQ 2 0       Fall Risk Assessment, last 12 mths 5/31/2022   Able to walk? Yes   Fall in past 12 months? 0   Do you feel unsteady?  0   Are you worried about falling 0       ADL Assessment 5/31/2022   Feeding yourself No Help Needed   Getting from bed to chair No Help Needed   Getting dressed No Help Needed   Bathing or showering No Help Needed   Walk across the room (includes cane/walker) No Help Needed   Using the telphone No Help Needed   Taking your medications No Help Needed   Preparing meals No Help Needed   Managing money (expenses/bills) No Help Needed   Moderately strenuous housework (laundry) No Help Needed   Shopping for personal items (toiletries/medicines) No Help Needed   Shopping for groceries No Help Needed   Driving No Help Needed   Climbing a flight of stairs No Help Needed   Getting to places beyond walking distances No Help Needed       Abuse Screening Questionnaire 5/31/2022   Do you ever feel afraid of your partner? N   Are you in a relationship with someone who physically or mentally threatens you? N   Is it safe for you to go home?  Camille Ly

## 2022-05-31 NOTE — PROGRESS NOTES
This is the Subsequent Medicare Annual Wellness Exam, performed 12 months or more after the Initial AWV or the last Subsequent AWV    I have reviewed the patient's medical history in detail and updated the computerized patient record. Assessment/Plan   Education and counseling provided:  Are appropriate based on today's review and evaluation    1. Encounter for Medicare annual wellness exam  2. Hyperlipidemia, unspecified hyperlipidemia type       Depression Risk Factor Screening     3 most recent PHQ Screens 5/31/2022   Little interest or pleasure in doing things Not at all   Feeling down, depressed, irritable, or hopeless Not at all   Total Score PHQ 2 0       Alcohol & Drug Abuse Risk Screen    Do you average more than 1 drink per night or more than 7 drinks a week: No    In the past three months have you have had more than 4 drinks containing alcohol on one occasion: No          Functional Ability and Level of Safety    Hearing: Hearing is good. Activities of Daily Living: The home contains: no safety equipment. Patient does total self care      Ambulation: with no difficulty     Fall Risk:  Fall Risk Assessment, last 12 mths 5/31/2022   Able to walk? Yes   Fall in past 12 months? 0   Do you feel unsteady?  0   Are you worried about falling 0      Abuse Screen:  Patient is not abused       Cognitive Screening    Has your family/caregiver stated any concerns about your memory: no      Health Maintenance Due     Health Maintenance Due   Topic Date Due    Medicare Yearly Exam  03/23/2022       Patient Care Team   Patient Care Team:  Shaneka Frost MD as PCP - General (Family Medicine)  Shaneka Frost MD as PCP - [address] [place] [address] Dr Levin Provider  Maria Del Carmen Sheppard MD (Orthopedic Surgery)  Monica Lama MD as Physician (Gastroenterology)  Vinh Tello MD (Cardiovascular Disease Physician)    History     Patient Active Problem List   Diagnosis Code    HLD (hyperlipidemia) E78.5    Osteopenia M85.80  Spinal stenosis M48.00    Hiatal hernia K44.9    Schatzki's ring K22.2    Neuropathic pain, leg, right M79.2    Chronic renal disease, stage III N18.30     Past Medical History:   Diagnosis Date    Cancer [facility])     Encounter for screening colonoscopy 09/13/2013    Dr Tracie Garcia - biopsies performed and determined to be normal - repeat in 5 years    Hiatal hernia     HLD (hyperlipidemia)     pt states he was placed on lipitor d/t plaque on abdominal aorta not elevated cholesterol    Ill-defined condition     hx malaria in 1970s    Ill-defined condition     eczema    Neuropathic pain, leg, right     Osteopenia     DEXA 1/25/17    Schatzki's ring     Spinal stenosis     DJD, stenosis, cyst along L5      Past Surgical History:   Procedure Laterality Date    COLONOSCOPY Left 7/19/2018    COLONOSCOPY performed by Annette Wasserman. Tashi Ruggiero MD at [address] HX CATARACT REMOVAL Bilateral     HX COLONOSCOPY  09/13/2013    polyps    HX ENDOSCOPY  08/2017    HX LUMBAR FUSION      HX OTHER SURGICAL      BCCA removed     Current Outpatient Medications   Medication Sig Dispense Refill    fluticasone furoate-vilanteroL (Breo Ellipta) 200-25 mcg/dose inhaler       atorvastatin (LIPITOR) 10 mg tablet Take 1 Tablet by mouth every other day. 90 Tablet 3    ascorbic acid, vitamin C, (Vitamin C) 1,000 mg tablet Take 1,000 mg by mouth daily.  calcium carbonate/vitamin D3 (CALCIUM + D PO) Take 1 Tablet by mouth two (2) times a day.  VITAMIN B COMPLEX PO Take  by mouth. Takes one po once daily.  Lactobacillus acidophilus (PROBIOTIC PO) Take  by mouth. Takes one po once daily.  albuterol (PROVENTIL HFA, VENTOLIN HFA, PROAIR HFA) 90 mcg/actuation inhaler Take 1 Puff by inhalation every four (4) hours as needed for Wheezing or Shortness of Breath.  (Patient not taking: Reported on 5/31/2022) 18 g 2     No Known Allergies    Family History   Problem Relation Age of Onset    COPD Father    Princess Stomach Cancer Maternal Grandfather      Social History     Tobacco Use    Smoking status: Former Smoker    Smokeless tobacco: Never Used    Tobacco comment: quit over 30 yrs ago   Substance Use Topics    Alcohol use: Yes     Alcohol/week: 1.0 standard drink     Types: 1 Shots of liquor per week     Comment: occasional     Will be moving to [facility] at the end of this month. Recent labs are normal including HLD.     Stevenson Ruiz MD

## 2023-05-24 RX ORDER — ATORVASTATIN CALCIUM 10 MG/1
10 TABLET, FILM COATED ORAL EVERY OTHER DAY
COMMUNITY
Start: 2022-05-31

## 2023-05-24 RX ORDER — FLUTICASONE FUROATE AND VILANTEROL 200; 25 UG/1; UG/1
POWDER RESPIRATORY (INHALATION)
COMMUNITY

## 2023-05-24 RX ORDER — ALBUTEROL SULFATE 90 UG/1
1 AEROSOL, METERED RESPIRATORY (INHALATION) EVERY 4 HOURS PRN
COMMUNITY
Start: 2022-02-14

## (undated) DEVICE — Z DISCONTINUED USE 2751540 TUBING IRRIG L10IN DISP PMP ENDOGATOR

## (undated) DEVICE — KENDALL RADIOLUCENT FOAM MONITORING ELECTRODE -RECTANGULAR SHAPE: Brand: KENDALL

## (undated) DEVICE — CONTAINER SPEC 20 ML LID NEUT BUFF FORMALIN 10 % POLYPR STS

## (undated) DEVICE — SET ADMIN 16ML TBNG L100IN 2 Y INJ SITE IV PIGGY BK DISP

## (undated) DEVICE — NEEDLE HYPO 18GA L1.5IN PNK S STL HUB POLYPR SHLD REG BVL

## (undated) DEVICE — SYR ASSEMB INFL BLLN 60ML --

## (undated) DEVICE — CONNECTOR TBNG AUX H2O JET DISP FOR OLY 160/180 SER

## (undated) DEVICE — KIT IV STRT W CHLORAPREP PD 1ML

## (undated) DEVICE — ENDO CARRY-ON PROCEDURE KIT INCLUDES ENZYMATIC SPONGE, GAUZE, BIOHAZARD LABEL, TRAY, LUBRICANT, DIRTY SCOPE LABEL, WATER LABEL, TRAY, DRAWSTRING PAD, AND DEFENDO 4-PIECE KIT.: Brand: ENDO CARRY-ON PROCEDURE KIT

## (undated) DEVICE — AIRLIFE™ U/CONNECT-IT OXYGEN TUBING 7 FEET (2.1 M) CRUSH-RESISTANT OXYGEN TUBING, VINYL TIPPED: Brand: AIRLIFE™

## (undated) DEVICE — BW-412T DISP COMBO CLEANING BRUSH: Brand: SINGLE USE COMBINATION CLEANING BRUSH

## (undated) DEVICE — BAG SPEC BIOHZD LF 2MIL 6X10IN -- CONVERT TO ITEM 357326

## (undated) DEVICE — CATH IV AUTOGRD BC BLU 22GA 25 -- INSYTE

## (undated) DEVICE — TUBING HYDR IRR --

## (undated) DEVICE — SOLIDIFIER FLUID 3000 CC ABSORB

## (undated) DEVICE — SYRINGE MED 20ML STD CLR PLAS LUERLOCK TIP N CTRL DISP

## (undated) DEVICE — 1200 GUARD II KIT W/5MM TUBE W/O VAC TUBE: Brand: GUARDIAN

## (undated) DEVICE — NEONATAL-ADULT SPO2 SENSOR: Brand: NELLCOR

## (undated) DEVICE — Z DISCONTINUED NO SUB IDED SET EXTN W/ 4 W STPCOCK M SPIN LOK 36IN

## (undated) DEVICE — CANN NASAL O2 CAPNOGRAPHY AD -- FILTERLINE

## (undated) DEVICE — QUILTED PREMIUM COMFORT UNDERPAD,EXTRA HEAVY: Brand: WINGS

## (undated) DEVICE — Device

## (undated) DEVICE — FORCEPS BX L240CM JAW DIA2.8MM L CAP W/ NDL MIC MESH TOOTH

## (undated) DEVICE — ESOPHAGEAL BALLOON DILATATION CATHETER: Brand: CRE FIXED WIRE

## (undated) DEVICE — BAG BELONG PT PERS CLEAR HANDL